# Patient Record
Sex: FEMALE | Race: BLACK OR AFRICAN AMERICAN | Employment: OTHER | URBAN - METROPOLITAN AREA
[De-identification: names, ages, dates, MRNs, and addresses within clinical notes are randomized per-mention and may not be internally consistent; named-entity substitution may affect disease eponyms.]

---

## 2020-01-13 ENCOUNTER — OFFICE VISIT (OUTPATIENT)
Dept: ORTHOPEDIC SURGERY | Age: 65
End: 2020-01-13
Payer: COMMERCIAL

## 2020-01-13 VITALS — HEIGHT: 65 IN | WEIGHT: 173.06 LBS | BODY MASS INDEX: 28.83 KG/M2

## 2020-01-13 PROCEDURE — 20610 DRAIN/INJ JOINT/BURSA W/O US: CPT | Performed by: ORTHOPAEDIC SURGERY

## 2020-01-13 PROCEDURE — 99204 OFFICE O/P NEW MOD 45 MIN: CPT | Performed by: ORTHOPAEDIC SURGERY

## 2020-01-13 RX ORDER — BETAMETHASONE SODIUM PHOSPHATE AND BETAMETHASONE ACETATE 3; 3 MG/ML; MG/ML
6 INJECTION, SUSPENSION INTRA-ARTICULAR; INTRALESIONAL; INTRAMUSCULAR; SOFT TISSUE ONCE
Status: COMPLETED | OUTPATIENT
Start: 2020-01-13 | End: 2020-01-13

## 2020-01-13 RX ADMIN — BETAMETHASONE SODIUM PHOSPHATE AND BETAMETHASONE ACETATE 6 MG: 3; 3 INJECTION, SUSPENSION INTRA-ARTICULAR; INTRALESIONAL; INTRAMUSCULAR; SOFT TISSUE at 16:03

## 2020-01-13 NOTE — PROGRESS NOTES
1/13/20  History of Present Illness:                             Felicia Soulier is a 76 y.o. female being seen for the first time she injured her knee while squatting approximately 3 to 4 months ago      Chief complaint presented in the office today: Pain swelling loss of motion significant swelling at the end of the day    Location left knee  Severity severe  Duration more than 3 months  Associated sign/symptoms pain, swelling, loss of motion    I have reviewed and discussed the below Pain assessment findings with the patient. Pain Assessment  Location of Pain: Knee  Location Modifiers: Left, Lateral  Severity of Pain: 8  Quality of Pain: Sharp, Aching  Duration of Pain: Persistent  Frequency of Pain: Constant  Aggravating Factors: Exercise, Stairs, Walking  Limiting Behavior: Some  Relieving Factors: Rest  Result of Injury: Yes  Work-Related Injury: No  Are there other pain locations you wish to document?: No    Medical History  Patient's medications, allergies, past medical, surgical, social and family histories were reviewed and updated as appropriate.     Past Medical History:   Diagnosis Date    Ankle fracture, lateral malleolus, closed 6/29/2010    Dr. Jose Manuel Hurd pain     Chronic, Dr. Verona Mancia BPPV (benign paroxysmal positional vertigo)     Dizziness and giddiness     Dysphagia     Fibroids 1987    Insomnia, unspecified     Meniere's disease, unspecified     Osteopenia     Other chest pain      Family History   Problem Relation Age of Onset    Diabetes Father         DM 2    Cancer Sister         Breast    Hypertension Other     High Blood Pressure Mother     Diabetes Paternal Aunt     Diabetes Paternal Uncle      Social History     Socioeconomic History    Marital status: Single     Spouse name: None    Number of children: None    Years of education: None    Highest education level: None   Occupational History    None   Social Needs    Financial resource strain: None    Food insecurity:     Worry: None     Inability: None    Transportation needs:     Medical: None     Non-medical: None   Tobacco Use    Smoking status: Never Smoker    Smokeless tobacco: Never Used   Substance and Sexual Activity    Alcohol use: No     Comment: Less than 1 glass per month of wine    Drug use: No    Sexual activity: Never   Lifestyle    Physical activity:     Days per week: None     Minutes per session: None    Stress: None   Relationships    Social connections:     Talks on phone: None     Gets together: None     Attends Oriental orthodox service: None     Active member of club or organization: None     Attends meetings of clubs or organizations: None     Relationship status: None    Intimate partner violence:     Fear of current or ex partner: None     Emotionally abused: None     Physically abused: None     Forced sexual activity: None   Other Topics Concern    None   Social History Narrative    None     Current Outpatient Medications   Medication Sig Dispense Refill    meclizine (ANTIVERT) 25 MG tablet Take 1 tablet by mouth 3 times daily as needed for Dizziness or Nausea. 30 tablet 2    hydrocodone-acetaminophen (CO-GESIC) 5-500 MG per tablet Take 1-2 tablets by mouth every 6 hours as needed for Pain. 30 tablet 0    Cholecalciferol (VITAMIN D) 1000 UNIT CAPS Take 1 capsule by mouth daily. No current facility-administered medications for this visit. Allergies   Allergen Reactions    Codeine Nausea Only       REVIEW OF SYSTEMS:   No acute rash  No numbness  No tingling  No fevers  No depression    Pertinent items are noted in HPI  Review of systems reviewed from Patient History Form dated on 1/13/2020 and available in the patient's chart under the Media tab. Examination:  General Exam:    Vitals: Height 5' 5\" (1.651 m), weight 173 lb 1 oz (78.5 kg).   Constitutional: Patient is adequately groomed with no evidence of floor with out pain    Gait: antalgic     Reflex:    Lower extremity Deep tendon reflexes +2/4 and equal bilaterally for patella and Achilles  Upper extremity reflexes:  of the biceps, triceps, brachioradialis +2/4 equal bilaterally    Contralateral  Knee: Negative Lachman negative anterior drawer negative pivot shift no posterior sag no posterior drawer does not open to valgus or varus stress at 0 or 30° negative Steinmann's, negative Diana's, negative Homans, negative Froylan, pedal pulses are +2/4 capillary refill is brisk sensation is intact ankle exam and hip exam are shows no pain with full range of motion provocative testing of the hip is nonpainful muscle testing around the hip is 5 over 5. Lumbar exam:    L1: good strength of the iliopsoas muscle upper lateral thigh sensation is intact  L2: good strength of the iliopsoas muscle and medial epicondyle sensation is intact Lateral thigh sensation is intact  L3: good strength with out pain of obturator externus muscle sensation is intact to medial epicondyle of the femur   L4:Good quadratus strength and gluteus medius and minimus strength, sensation is intact to medial malleolus  L5:Intact Extensor hallucis and tibialis posterior strength, sensation is intact to dorsum of the foot. S1:  Plantar foot sensation is intact  S2:  Posterior thigh and calf sensation is intact      Hip and lumbar testing does not reproduce pain provocative testing does not reproduce the symptomatology. Additional Examinations:  Right Upper Extremity:  Examination of the right upper extremity does not show any tenderness, deformity or injury. Range of motion is unremarkable. There is no gross instability. There are no rashes, ulcerations or lesions. Strength and tone are normal.  Left Upper Extremity: Examination of the left upper extremity does not show any tenderness, deformity or injury. Range of motion is unremarkable. There is no gross instability.   There are no rashes, ulcerations or lesions. Strength and tone are normal.  Lower Back: Examination of the lower back does not show any tenderness, deformity or injury. Range of motion is unremarkable. There is no gross instability. There are no rashes, ulcerations or lesions. Strength and tone are normal.          IMPRESSION:    Diagnostic testing:  X-rays reviewed in office, I independently reviewed the films in the office today:  I reviewed multiple X-rays of the left knee today, anterior posterior, lateral, notch view, skyline view:  Show no fracture no dislocation moderate shift with moderate arthritic wear, decreased joint space maintenance, no masses or tumors, no signs of any significant osteopenia, no obvious OCD lesions, no loose bodies seen. Impression early arthritis with shifting  MRI: Ordered today to rule out simple loose body  Labs: None ordered      Past Surgical History:   Procedure Laterality Date    COLONOSCOPY  2007    UPPER GASTROINTESTINAL ENDOSCOPY  2009   . Office Procedures:  Orders Placed This Encounter   Procedures    XR KNEE LEFT (MIN 4 VIEWS)     Standing Status:   Future     Number of Occurrences:   1     Standing Expiration Date:   1/13/2021     I discussed in detail the risks, benefits and complications of an injection which included but are not limited to infection, skin reactions, hot swollen joint, and anaphylaxis with the patient. The patient verbalized understanding and gave informed consent for the injection. The patient's skin was prepped using sterile alcohol solution. A sterile 22-gauge needle was inserted into the left knee and a mixture of 3ml of 6mg/ml Celestone, 7mL of 0.5% Marcaine  was injected under sterile technique. The needle was withdrawn and the puncture site sealed with a Band-Aid. The patient tolerated the injection well. The patient was instructed to call the office immediately if there is any pain, redness, warmth, fever, or chills.   Prior to the injection I aspirated 70 cc of straw-colored exudate  Previous Treatments: Ice, rest, exercises, X-ray, anti-inflammatories,    Differential diagnosis: Medial meniscal tear, Lateral meniscal tear, Synovitis,  Loose body, stress fracture, patella femoral syndrome, osteoarthritis, chondral lesion, ACL tear, PCL injury, MCL or LCL injury, Capsular injury, infection, contusion, hip pathology, lumbar radiculopathy, Muscle injury, bone tumor, fracture, femoral acetabular osteoarthritis,    Diagnosis: Osteoarthritis with probable loose body and severe joint effusion        Plan: (Medical Decision Making)    1. Medications -injection and aspiration today in the future  2. PT -in the future  3. Further imaging -MRI  4. Follow up -after MRI    Luz Trejo D.O. [address] [place] [address] and Sports Medicine  Sports [facility] Tra[facility]  Board Certified  [employer] Team Physician          Disclaimer: \"This note was dictated with voice recognition software. Though review and correction are routine, we apologize for any errors. \"

## 2020-01-27 ENCOUNTER — OFFICE VISIT (OUTPATIENT)
Dept: ORTHOPEDIC SURGERY | Age: 65
End: 2020-01-27
Payer: MEDICARE

## 2020-01-27 VITALS — WEIGHT: 173.06 LBS | HEIGHT: 65 IN | BODY MASS INDEX: 28.83 KG/M2

## 2020-01-27 PROBLEM — M25.562 ACUTE PAIN OF LEFT KNEE: Status: ACTIVE | Noted: 2020-01-27

## 2020-01-27 PROCEDURE — 99214 OFFICE O/P EST MOD 30 MIN: CPT | Performed by: ORTHOPAEDIC SURGERY

## 2020-01-27 NOTE — PROGRESS NOTES
1/27/20  History of Present Illness:  Yassine Narayan is a 76 y.o. female    Chief complaint today in the office: Recheck evaluation of left knee    Location left knee   Severity severe  Duration several months  Associated sign/symptoms pain, swelling, catching, locking,    Medical History  Patient's medications, allergies, past medical, surgical, social and family histories were reviewed and updated as appropriate. Review of Systems  No new rashes  No numbness  No tingling  No fever  No depression  No new pain pattern  Pertinent items are noted in HPI  Review of systems reviewed from Patient History Form dated on 1/27/2020 and available in the patient's chart under the Media tab. No change in the patients medical history form. Examination:  General Exam:    Vitals: Height 5' 5\" (1.651 m), weight 173 lb 1 oz (78.5 kg). Constitutional: Patient is adequately groomed with no evidence of malnutrition  Mental Status: The patient is alert and  oriented to time, place and person. The patient's mood and affect are appropriate. Lymphatic: The lymphatic examination bilaterally reveals all areas to be without enlargement or induration. Vascular: Examination reveals no swelling or calf tenderness. Peripheral pulses are palpable and 2+. Neurological: The patient has good coordination. There is no weakness or sensory deficit. Skin:    Head/Neck: inspection reveals no rashes, ulcerations or lesions. Trunk:  inspection reveals no rashes, ulcerations or lesions. Right Lower Extremity: inspection reveals no rashes, ulcerations or lesions. Left Lower Extremity: inspection reveals no rashes, ulcerations or lesions.                                           PHYSICAL EXAM:        Knee Examination  Inspection:  No abrasions no lacerations no signs of infection or obvious deformity moderate obvious  swelling and joint effusion     Palpation:   Palpation reveals moderate pain medial joint line,   Moderate lateral

## 2020-01-28 ENCOUNTER — TELEPHONE (OUTPATIENT)
Dept: ORTHOPEDIC SURGERY | Age: 65
End: 2020-01-28

## 2020-01-30 RX ORDER — MELOXICAM 15 MG/1
15 TABLET ORAL DAILY
Qty: 90 TABLET | Refills: 3 | Status: SHIPPED | OUTPATIENT
Start: 2020-02-04

## 2020-01-30 NOTE — PROGRESS NOTES
Name_______________________________________Printed:____________________  Date and time of hkwnxit__8-1-32______________________Kvbbfkb Time:_0900 MASC_______________   1. The instructions given regarding when and if a patient needs to stop oral intake prior to surgery varies. Follow the specific instructions you were given                  _X__Nothing to eat or to drink after Midnight the night before.                             ____Endoscopy patient follow your DRS instructions-generally you will be doing a part of the prep after Midnight                   ____Carbo loading or ERAS instructions will be given to select patients-if you have been given those instructions -please do the following                           The evening before your surgery after dinner before midnight drink 40 ounces of gatorade. If you are diabetic use sugar free. The morning of surgery drink 40 ounces of water. This needs to be finished 3 hours prior to your surgery start time. 2. Take the following pills with a small sip of water on the morning of surgery__NONE_________________________________________________                  Do not take blood pressure medications ending in pril or sartan the ricky prior to surgery or the morning of surgery_   3. Aspirin, Ibuprofen, Advil, Naproxen, Vitamin E and other Anti-inflammatory products and supplements should be stopped for 5 -7days before surgery or as directed by your physician. 4. Check with your Doctor regarding stopping Plavix, Coumadin,Eliquis, Lovenox,Effient,Pradaxa,Xarelto, Fragmin or other blood thinners and follow their instructions. 5. Do not smoke, and do not drink any alcoholic beverages 24 hours prior to surgery. This includes NA Beer. Refrain from the usage of any recreational drugs. 6. You may brush your teeth and gargle the morning of surgery. DO NOT SWALLOW WATER   7.  You MUST make arrangements for a responsible adult to stay on site while you are here and take you home after your surgery. You will not be allowed to leave alone or drive yourself home. It is strongly suggested someone stay with you the first 24 hrs. Your surgery will be cancelled if you do not have a ride home. 8. A parent/legal guardian must accompany a child scheduled for surgery and plan to stay at the hospital until the child is discharged. Please do not bring other children with you. 9. Please wear simple, loose fitting clothing to the hospital.  Lisa Thomson not bring valuables (money, credit cards, checkbooks, etc.) Do not wear any makeup (including no eye makeup) or nail polish on your fingers or toes. 10. DO NOT wear any jewelry or piercings on day of surgery. All body piercing jewelry must be removed. 11. If you have ___dentures, they will be removed before going to the OR; we will provide you a container. If you wear ___contact lenses or ___glasses, they will be removed; please bring a case for them. 12. Please see your family doctor/pediatrician for a history & physical and/or concerning medications. Bring any test results/reports from your physician's office. PCP__________________Phone___________H&P Appt. Date________             13 If you  have a Living Will and Durable Power of  for Healthcare, please bring in a copy. 15. Notify your Surgeon if you develop any illness between now and surgery  time, cough, cold, fever, sore throat, nausea, vomiting, etc.  Please notify your surgeon if you experience dizziness, shortness of breath or blurred vision between now & the time of your surgery             15. DO NOT shave your operative site 96 hours prior to surgery. For face & neck surgery, men may use an electric razor 48 hours prior to surgery. 16. Shower the night before or morning of surgery using an antibacterial soap or as you have been instructed.              17. To provide excellent care visitors will be limited to one in the room at any given time. 18.  Please bring picture ID and insurance card. 19.  Visit our web site for additional information:  [email or web address]              20.During flu season no children under the age of 15 are permitted in the hospital for the safety of all patients. 21. If you take a long acting insulin in the evening only  take half of your usual  dose the night  before your procedure              22. If you use a c-pap please bring DOS if staying overnight,             23.For your convenience [facility] has a pharmacy on site to fill your prescriptions. 24. If you use oxygen and have a portable tank please bring it  with you the DOS             25. Bring a complete list of all your medications with name and dose include any supplements. 26. Other__________________________________________   *Please call pre admission testing if you any further questions   [address] [place] [address]. [place]  [phone]   [address] [place] [address]       All above information reviewed with patient in person or by phone. Patient verbalizes understanding. All questions and concerns addressed.                                                                                                  Patient/Rep___PT_________________                                                                                                                                    PRE OP INSTRUCTIONS

## 2020-02-01 ENCOUNTER — PREP FOR PROCEDURE (OUTPATIENT)
Dept: ORTHOPEDIC SURGERY | Age: 65
End: 2020-02-01

## 2020-02-01 RX ORDER — ONDANSETRON 2 MG/ML
4 INJECTION INTRAMUSCULAR; INTRAVENOUS EVERY 6 HOURS PRN
Status: CANCELLED | OUTPATIENT
Start: 2020-02-01

## 2020-02-01 RX ORDER — DOCUSATE SODIUM 100 MG/1
100 CAPSULE, LIQUID FILLED ORAL 2 TIMES DAILY
Status: CANCELLED | OUTPATIENT
Start: 2020-02-01

## 2020-02-04 ENCOUNTER — HOSPITAL ENCOUNTER (OUTPATIENT)
Age: 65
Setting detail: OUTPATIENT SURGERY
Discharge: HOME | End: 2020-02-04
Attending: ORTHOPAEDIC SURGERY | Admitting: ORTHOPAEDIC SURGERY
Payer: MEDICARE

## 2020-02-04 ENCOUNTER — ANESTHESIA EVENT (OUTPATIENT)
Dept: OPERATING ROOM | Age: 65
End: 2020-02-04
Payer: MEDICARE

## 2020-02-04 ENCOUNTER — ANESTHESIA (OUTPATIENT)
Dept: OPERATING ROOM | Age: 65
End: 2020-02-04
Payer: MEDICARE

## 2020-02-04 VITALS
TEMPERATURE: 97.4 F | WEIGHT: 155 LBS | BODY MASS INDEX: 26.46 KG/M2 | HEIGHT: 64 IN | OXYGEN SATURATION: 100 % | HEART RATE: 61 BPM | DIASTOLIC BLOOD PRESSURE: 75 MMHG | SYSTOLIC BLOOD PRESSURE: 131 MMHG | RESPIRATION RATE: 16 BRPM

## 2020-02-04 VITALS — DIASTOLIC BLOOD PRESSURE: 53 MMHG | OXYGEN SATURATION: 99 % | SYSTOLIC BLOOD PRESSURE: 101 MMHG

## 2020-02-04 PROCEDURE — 2580000003 HC RX 258: Performed by: ORTHOPAEDIC SURGERY

## 2020-02-04 PROCEDURE — 6360000002 HC RX W HCPCS: Performed by: NURSE ANESTHETIST, CERTIFIED REGISTERED

## 2020-02-04 PROCEDURE — 2580000003 HC RX 258: Performed by: ANESTHESIOLOGY

## 2020-02-04 PROCEDURE — 6360000002 HC RX W HCPCS: Performed by: ORTHOPAEDIC SURGERY

## 2020-02-04 PROCEDURE — 3700000000 HC ANESTHESIA ATTENDED CARE: Performed by: ORTHOPAEDIC SURGERY

## 2020-02-04 PROCEDURE — 2500000003 HC RX 250 WO HCPCS: Performed by: NURSE ANESTHETIST, CERTIFIED REGISTERED

## 2020-02-04 PROCEDURE — 7100000000 HC PACU RECOVERY - FIRST 15 MIN: Performed by: ORTHOPAEDIC SURGERY

## 2020-02-04 PROCEDURE — 6370000000 HC RX 637 (ALT 250 FOR IP): Performed by: ANESTHESIOLOGY

## 2020-02-04 PROCEDURE — 3600000004 HC SURGERY LEVEL 4 BASE: Performed by: ORTHOPAEDIC SURGERY

## 2020-02-04 PROCEDURE — 2500000003 HC RX 250 WO HCPCS: Performed by: ORTHOPAEDIC SURGERY

## 2020-02-04 PROCEDURE — 7100000010 HC PHASE II RECOVERY - FIRST 15 MIN: Performed by: ORTHOPAEDIC SURGERY

## 2020-02-04 PROCEDURE — 3600000014 HC SURGERY LEVEL 4 ADDTL 15MIN: Performed by: ORTHOPAEDIC SURGERY

## 2020-02-04 PROCEDURE — 7100000001 HC PACU RECOVERY - ADDTL 15 MIN: Performed by: ORTHOPAEDIC SURGERY

## 2020-02-04 PROCEDURE — 2720000010 HC SURG SUPPLY STERILE: Performed by: ORTHOPAEDIC SURGERY

## 2020-02-04 PROCEDURE — 7100000011 HC PHASE II RECOVERY - ADDTL 15 MIN: Performed by: ORTHOPAEDIC SURGERY

## 2020-02-04 PROCEDURE — 2709999900 HC NON-CHARGEABLE SUPPLY: Performed by: ORTHOPAEDIC SURGERY

## 2020-02-04 PROCEDURE — 3700000001 HC ADD 15 MINUTES (ANESTHESIA): Performed by: ORTHOPAEDIC SURGERY

## 2020-02-04 RX ORDER — FENTANYL CITRATE 50 UG/ML
INJECTION, SOLUTION INTRAMUSCULAR; INTRAVENOUS PRN
Status: DISCONTINUED | OUTPATIENT
Start: 2020-02-04 | End: 2020-02-04 | Stop reason: SDUPTHER

## 2020-02-04 RX ORDER — HYDROMORPHONE HCL 110MG/55ML
0.5 PATIENT CONTROLLED ANALGESIA SYRINGE INTRAVENOUS EVERY 5 MIN PRN
Status: DISCONTINUED | OUTPATIENT
Start: 2020-02-04 | End: 2020-02-04 | Stop reason: HOSPADM

## 2020-02-04 RX ORDER — ONDANSETRON 2 MG/ML
4 INJECTION INTRAMUSCULAR; INTRAVENOUS EVERY 6 HOURS PRN
Status: DISCONTINUED | OUTPATIENT
Start: 2020-02-04 | End: 2020-02-04 | Stop reason: HOSPADM

## 2020-02-04 RX ORDER — TRAMADOL HYDROCHLORIDE 50 MG/1
50 TABLET ORAL ONCE
Status: COMPLETED | OUTPATIENT
Start: 2020-02-04 | End: 2020-02-04

## 2020-02-04 RX ORDER — PROPOFOL 10 MG/ML
INJECTION, EMULSION INTRAVENOUS PRN
Status: DISCONTINUED | OUTPATIENT
Start: 2020-02-04 | End: 2020-02-04 | Stop reason: SDUPTHER

## 2020-02-04 RX ORDER — TRAMADOL HYDROCHLORIDE 50 MG/1
TABLET ORAL
Status: DISCONTINUED
Start: 2020-02-04 | End: 2020-02-04 | Stop reason: HOSPADM

## 2020-02-04 RX ORDER — SODIUM CHLORIDE 9 MG/ML
INJECTION, SOLUTION INTRAVENOUS CONTINUOUS
Status: DISCONTINUED | OUTPATIENT
Start: 2020-02-04 | End: 2020-02-04

## 2020-02-04 RX ORDER — PROPOFOL 10 MG/ML
INJECTION, EMULSION INTRAVENOUS CONTINUOUS PRN
Status: DISCONTINUED | OUTPATIENT
Start: 2020-02-04 | End: 2020-02-04 | Stop reason: SDUPTHER

## 2020-02-04 RX ORDER — SODIUM CHLORIDE 0.9 % (FLUSH) 0.9 %
10 SYRINGE (ML) INJECTION EVERY 12 HOURS SCHEDULED
Status: DISCONTINUED | OUTPATIENT
Start: 2020-02-04 | End: 2020-02-04 | Stop reason: HOSPADM

## 2020-02-04 RX ORDER — SODIUM CHLORIDE 9 MG/ML
INJECTION, SOLUTION INTRAVENOUS CONTINUOUS
Status: DISCONTINUED | OUTPATIENT
Start: 2020-02-04 | End: 2020-02-04 | Stop reason: HOSPADM

## 2020-02-04 RX ORDER — LIDOCAINE HYDROCHLORIDE 20 MG/ML
INJECTION, SOLUTION EPIDURAL; INFILTRATION; INTRACAUDAL; PERINEURAL PRN
Status: DISCONTINUED | OUTPATIENT
Start: 2020-02-04 | End: 2020-02-04 | Stop reason: SDUPTHER

## 2020-02-04 RX ORDER — SODIUM CHLORIDE 0.9 % (FLUSH) 0.9 %
10 SYRINGE (ML) INJECTION PRN
Status: DISCONTINUED | OUTPATIENT
Start: 2020-02-04 | End: 2020-02-04 | Stop reason: HOSPADM

## 2020-02-04 RX ORDER — PROMETHAZINE HYDROCHLORIDE 25 MG/ML
6.25 INJECTION, SOLUTION INTRAMUSCULAR; INTRAVENOUS
Status: DISCONTINUED | OUTPATIENT
Start: 2020-02-04 | End: 2020-02-04 | Stop reason: HOSPADM

## 2020-02-04 RX ORDER — FENTANYL CITRATE 50 UG/ML
25 INJECTION, SOLUTION INTRAMUSCULAR; INTRAVENOUS EVERY 5 MIN PRN
Status: DISCONTINUED | OUTPATIENT
Start: 2020-02-04 | End: 2020-02-04 | Stop reason: HOSPADM

## 2020-02-04 RX ORDER — DOCUSATE SODIUM 100 MG/1
100 CAPSULE, LIQUID FILLED ORAL 2 TIMES DAILY
Status: DISCONTINUED | OUTPATIENT
Start: 2020-02-04 | End: 2020-02-04 | Stop reason: HOSPADM

## 2020-02-04 RX ORDER — LABETALOL HYDROCHLORIDE 5 MG/ML
5 INJECTION, SOLUTION INTRAVENOUS EVERY 10 MIN PRN
Status: DISCONTINUED | OUTPATIENT
Start: 2020-02-04 | End: 2020-02-04 | Stop reason: HOSPADM

## 2020-02-04 RX ADMIN — SODIUM CHLORIDE: 9 INJECTION, SOLUTION INTRAVENOUS at 09:28

## 2020-02-04 RX ADMIN — CEFAZOLIN SODIUM 2 G: 10 INJECTION, POWDER, FOR SOLUTION INTRAVENOUS at 10:21

## 2020-02-04 RX ADMIN — FENTANYL CITRATE 25 MCG: 50 INJECTION, SOLUTION INTRAMUSCULAR; INTRAVENOUS at 10:29

## 2020-02-04 RX ADMIN — PROPOFOL 10 MG: 10 INJECTION, EMULSION INTRAVENOUS at 10:30

## 2020-02-04 RX ADMIN — LIDOCAINE HYDROCHLORIDE 60 MG: 20 INJECTION, SOLUTION EPIDURAL; INFILTRATION; INTRACAUDAL; PERINEURAL at 10:29

## 2020-02-04 RX ADMIN — PROPOFOL 20 MG: 10 INJECTION, EMULSION INTRAVENOUS at 10:29

## 2020-02-04 RX ADMIN — FENTANYL CITRATE 25 MCG: 50 INJECTION, SOLUTION INTRAMUSCULAR; INTRAVENOUS at 10:37

## 2020-02-04 RX ADMIN — PROPOFOL 120 MCG/KG/MIN: 10 INJECTION, EMULSION INTRAVENOUS at 10:29

## 2020-02-04 RX ADMIN — FENTANYL CITRATE 25 MCG: 50 INJECTION, SOLUTION INTRAMUSCULAR; INTRAVENOUS at 10:59

## 2020-02-04 RX ADMIN — TRAMADOL HYDROCHLORIDE 50 MG: 50 TABLET, FILM COATED ORAL at 11:46

## 2020-02-04 RX ADMIN — FENTANYL CITRATE 25 MCG: 50 INJECTION, SOLUTION INTRAMUSCULAR; INTRAVENOUS at 11:10

## 2020-02-04 ASSESSMENT — PULMONARY FUNCTION TESTS
PIF_VALUE: 0
PIF_VALUE: 1
PIF_VALUE: 0
PIF_VALUE: 1
PIF_VALUE: 0
PIF_VALUE: 1
PIF_VALUE: 0
PIF_VALUE: 1
PIF_VALUE: 0
PIF_VALUE: 1
PIF_VALUE: 0
PIF_VALUE: 0

## 2020-02-04 ASSESSMENT — PAIN DESCRIPTION - DESCRIPTORS: DESCRIPTORS: DISCOMFORT;ACHING

## 2020-02-04 ASSESSMENT — PAIN DESCRIPTION - FREQUENCY: FREQUENCY: CONTINUOUS

## 2020-02-04 ASSESSMENT — PAIN DESCRIPTION - ONSET: ONSET: ON-GOING

## 2020-02-04 NOTE — ANESTHESIA PRE PROCEDURE
[facility] Department of Anesthesiology  Pre-Anesthesia Evaluation/Consultation       Name:  Candice Roland  : 1955  Age:  76 y.o. MRN:  [id number]  Date: 2020           Surgeon: Surgeon(s):  Gustabo Chao DO    Procedure: Procedure(s):  LEFT KNEE ARTHROSCOPE, PARTIAL MEDIAL AND LATERAL MENISCECTOMY, LOOSE BODIES REMOVAL  (44222, [id number])     Allergies   Allergen Reactions    Codeine Nausea Only, Nausea And Vomiting, Rash and Swelling    Adhesive Tape Rash     Patient Active Problem List   Diagnosis    Osteopenia    Acute pain of left knee     Past Medical History:   Diagnosis Date    Ankle fracture, lateral malleolus, closed 2010    Dr. Laquita Camejo    Ankle pain     Chronic, Dr. Lacey Pace BPPV (benign paroxysmal positional vertigo)     Dizziness and giddiness     Dysphagia     Fibroids     Hx of esophageal spasm     Insomnia, unspecified     Meniere's disease, unspecified     Osteopenia     Other chest pain      Past Surgical History:   Procedure Laterality Date    COLONOSCOPY  2007    UPPER GASTROINTESTINAL ENDOSCOPY       Social History     Tobacco Use    Smoking status: Never Smoker    Smokeless tobacco: Never Used   Substance Use Topics    Alcohol use: Not Currently    Drug use: No     Medications  No current facility-administered medications on file prior to encounter. Current Outpatient Medications on File Prior to Encounter   Medication Sig Dispense Refill    meclizine (ANTIVERT) 25 MG tablet Take 1 tablet by mouth 3 times daily as needed for Dizziness or Nausea. (Patient taking differently: Take 25 mg by mouth 3 times daily as needed for Dizziness or Nausea (NOT TAKING) ) 30 tablet 2    hydrocodone-acetaminophen (CO-GESIC) 5-500 MG per tablet Take 1-2 tablets by mouth every 6 hours as needed for Pain. 30 tablet 0    Cholecalciferol (VITAMIN D) 1000 UNIT CAPS Take 1 capsule by mouth daily. BMP    Lab Results   Component Value Date     05/23/2011    K 4.6 05/23/2011     05/23/2011    CO2 27 05/23/2011    BUN 13 05/23/2011    CREATININE 0.9 05/23/2011    CALCIUM 9.8 05/23/2011    GFRAA >60 05/23/2011    GLUCOSE 83 05/23/2011     POCGlucose  No results for input(s): GLUCOSE in the last 72 hours. Coags  No results found for: PROTIME, INR, APTT  HCG (If Applicable) No results found for: PREGTESTUR, PREGSERUM, HCG, HCGQUANT   ABGs No results found for: PHART, PO2ART, HYT5VCH, WOP5UUU, BEART, N4PPNMDZ   Type & Screen (If Applicable)  No results found for: LABABO, LABRH                         BMI: Wt Readings from Last 3 Encounters:       NPO Status:   Date of last liquid consumption: 02/04/20   Time of last liquid consumption: 0000   Date of last solid food consumption: 02/04/20      Time of last solid consumption: 0000       Anesthesia Evaluation  Patient summary reviewed no history of anesthetic complications:   Airway: Mallampati: III  TM distance: >3 FB   Neck ROM: full   Dental:    (+) caps      Pulmonary:Negative Pulmonary ROS and normal exam                               Cardiovascular:Negative CV ROS  Exercise tolerance: good (>4 METS),           Rhythm: regular  Rate: normal           Beta Blocker:  Not on Beta Blocker         Neuro/Psych:   Negative Neuro/Psych ROS              GI/Hepatic/Renal:   (+) GERD:,           Endo/Other: Negative Endo/Other ROS                    Abdominal:           Vascular: negative vascular ROS. Anesthesia Plan      MAC     ASA 2       Induction: intravenous. MIPS: Postoperative opioids intended and Prophylactic antiemetics administered. Anesthetic plan and risks discussed with patient and sibling. Plan discussed with CRNA.               This pre-anesthesia assessment may be used as a history and physical.    DOS STAFF ADDENDUM:    Pt seen and examined, chart reviewed (including anesthesia, drug and allergy history). No interval changes to history and physical examination. Anesthetic plan, risks, benefits, alternatives, and personnel involved discussed with patient. Questions and concerns addressed. Patient(family) verbalized an understanding and agrees to proceed.       Maria L Carver MD  February 4, 2020  9:41 AM

## 2020-02-04 NOTE — OP NOTE
chondroplasty. A separate arthroscopy of the lateral joint space compartment (which includes the the lateral femoral condyle the lateral tibial plateau the lateral meniscus popliteus and portions of the anterior cruciate ligament ) procedure performed was synovectomy, loose body removal.    A separate arthroscopy of the anterior joint space compartment (which included the anterior fat pad anterior to the medial and lateral compartment anterior to the medial and lateral gutter anterior to the intercondylar notch inferior to the patella trochlea articulation ) with procedure performed synovectomy, multiple loose body removal.    A separate arthroscopy of the posterior joint space compartment (behind the ACL, behind the PCL, posterior joint space behind the medial meniscus and posterior joint space behind the lateral meniscus including the likely area of a Baker's cyst ) with procedure performed synovectomy, multiple loose body removal.    A separate arthroscopy of the superior patella pouch compartment (which includes the superior patellar pouch medial to the medial gutter and superior to the medial gutter lateral to the lateral gutter and superior to the lateral gutter from the patella trochlea area all the way to the top of the knee joint )  with procedure performed synovectomy, loose body removal.    A separate arthroscopy of the medial gutter compartment (from the anterior medial joint space medial to the medial joint space anterior to the posterior joint space and below the superior patellar pouch and trochlea patella area.  )  With procedure performed synovectomy, loose body removal.    A separate arthroscopy of the lateral gutter compartment with procedure performed synovectomy, loose body removal.    A separate arthroscopy of the intercondylar notch compartment with procedure performed synovectomy.     A separate arthroscopy of the patella and trochlea articulation compartment and area with procedure performed synovectomy and loose bodies removed. Procedure in detail:  Patient was seen and evaluated A history and physical was obtained a written consent was discussed and signed by the patient. Following the anesthesia evaluation and discussion with the patient about the scheduled procedure and recovery along with postoperative follow-up plans the patient was brought back to the operative suite put on the operative table in the supine position. At this point the patient was given a MAC anesthetic. I personally scrubbed the knee with alcohol and Betadine and injected the joint with 60 mL total 30 mL of Marcaine 30 mL of lidocaine the lidocaine did have epinephrine using an 18-gauge 1-1/2 inch needle. The knee was put through a full range of motion and tested for stability with Lachman and anterior drawer pivot shift I also performed a posterior drawer assessed stability to valgus and varus stress at 0 and 30°. Following the evaluation and injection the knee was scrubbed with DuraPrep from upper thigh where we had put a U drape on the way through the foot. Then using sterile technique we draped the leg in a sterile manner. The inferior lateral portal was made using a sharp scalpel and a blunt trocar. The camera was slipped into the cannula suction and irrigation was hoped and started on the cannula. Once the fluid was running we could see the joint very nicely it was irrigated copiously to remove all loose debris and get a nice clear 4 K picture. A diagnostic arthroscopy was performed to evaluate the superior patellar pouch, the medial gutter, the lateral gutter, anterior medial joint space the anterior lateral joint space both meniscuses were probed medially and laterally using the figure-of-four for the lateral joint space using the lateral bar for the medial joint space the ACL and PCL were probed and the chondral surface was evaluated and probed.                  Seeing that the medial meniscus was torn and it was probed and evaluated and deemed unrepairable. Following this I went ahead and used a straight biter and a angled up biter to saucerized the tear then I used a shaver to remove the pieces and cleaning the edges and a bipolar to smooth this off. I went ahead and probed this it was a stable construct there were no more tears and it was a smooth transition. Seeing that the lateral meniscus was torn and it was evaluated and deemed nonrepairable. Upon probing and evaluating this I went ahead and used a straight duckbill biter and angled up biter to saucerize the lateral meniscus tear. I then used a shaver to clean off the loose pieces and trimmed off the edges followed by the bipolar to smooth this off. I went ahead and progress it was a nice stable construct. The camera was initially put in the lateral portal and a shaver was brought into the medial portal using the shaver I remove the synovial tissue from the superior patellar pouch and then used the bipolar to cauterize any bleeding tissue. From the same portal I remove the synovium from the medial gutter patella trochlear area the anterior medial joint space part of the anterior lateral joint space in the intertrochlear area using both the shaver and the bipolar. I now changed the camera to the medial portal using the shaver in the lateral portal I remove the remainder of the synovium from the anterior joint space the anterior lateral joint space in the lateral gutter all the way back up to the superior patellar pouch and across into the patella trochlear area once this was all excised we used the bipolar to smooth off any tissue and cauterize any bleeding tissue. The chondral surface had loose hanging debris. A chondroplasty was performed. The chondral surface was was trimmed with a shaver down to a stable surface and then I used a bipolar to smooth off the edges.       Upon

## 2020-02-04 NOTE — PROGRESS NOTES
Pt awake and alert. Pt on RA, VSS. Brother at bedside. Pt with c/o pain (see MAR), denies nausea, tolerating PO. Skin warm LLE, palpable pulses and able to wiggle toes  Pt meets criteria to be discharged from phase 1.

## 2020-02-04 NOTE — ANESTHESIA POSTPROCEDURE EVALUATION
[facility] Department of Anesthesiology  Post-Anesthesia Note       Name:  Cristopher Brand                                  Age:  76 y.o. MRN:  [id number]     Last Vitals & Oxygen Saturation: BP (!) 131/55   Pulse 68   Temp 97.1 °F (36.2 °C) (Temporal)   Resp 16   Ht 5' 4\" (1.626 m)   Wt 155 lb (70.3 kg)   SpO2 99%   BMI 26.61 kg/m²   Patient Vitals for the past 4 hrs:   BP Temp Temp src Pulse Resp SpO2 Height Weight   02/04/20 1130 (!) 131/55 97.1 °F (36.2 °C) Temporal 68 16 99 % -- --   02/04/20 1125 (!) 113/53 -- -- 68 -- 100 % -- --   02/04/20 1120 128/70 -- -- -- -- -- -- --   02/04/20 1118 126/69 97.2 °F (36.2 °C) Temporal 105 13 99 % -- --   02/04/20 0912 (!) 140/74 97.3 °F (36.3 °C) Temporal 71 16 97 % -- --   02/04/20 0906 -- -- -- -- -- -- 5' 4\" (1.626 m) 155 lb (70.3 kg)       Level of consciousness:  Awake, alert    Respiratory: Respirations easy, no distress. Stable. Cardiovascular: Hemodynamically stable. Hydration: Adequate. PONV: Adequately managed. Post-op pain: Adequately controlled. Post-op assessment: Tolerated anesthetic well without complication. Complications:  None.     Alma Flores MD  February 4, 2020   11:59 AM

## 2020-02-04 NOTE — PROGRESS NOTES
Pt arrived from OR to PACU bay 3. Report received from OR staff. Pt arouses easily to voice. Surgical dressing in place to left knee. Pt on RA, NSR, VSS. Will continue to monitor.

## 2020-02-04 NOTE — PROGRESS NOTES
CLINICAL PHARMACY NOTE: MEDS TO 3230 cFares Select Patient?: No  Total # of Prescriptions Filled: 2   The following medications were delivered to the patient:  · meloxicam 15mg  · Tramadol/APAP 37.5/325mg  Total # of Interventions Completed: 0  Time Spent (min): 30    Additional Documentation:  Insurance only paid for qty 21 tablets of ultracet, patient ok to pay for full qty.   Medications delivered- brother signed  Dc Rondon

## 2020-02-13 ENCOUNTER — OFFICE VISIT (OUTPATIENT)
Dept: ORTHOPEDIC SURGERY | Age: 65
End: 2020-02-13

## 2020-02-13 VITALS — BODY MASS INDEX: 26.46 KG/M2 | HEIGHT: 64 IN | WEIGHT: 154.98 LBS

## 2020-02-13 PROBLEM — S83.242D TEAR OF MEDIAL MENISCUS OF LEFT KNEE, SUBSEQUENT ENCOUNTER: Status: ACTIVE | Noted: 2020-02-13

## 2020-02-13 PROBLEM — M12.20 PVNS (PIGMENTED VILLONODULAR SYNOVITIS): Status: ACTIVE | Noted: 2020-02-13

## 2020-02-13 PROCEDURE — 99024 POSTOP FOLLOW-UP VISIT: CPT | Performed by: ORTHOPAEDIC SURGERY

## 2020-02-19 ENCOUNTER — HOSPITAL ENCOUNTER (OUTPATIENT)
Dept: PHYSICAL THERAPY | Age: 65
Setting detail: THERAPIES SERIES
Discharge: HOME | End: 2020-02-19
Payer: MEDICARE

## 2020-02-19 PROCEDURE — 97161 PT EVAL LOW COMPLEX 20 MIN: CPT

## 2020-02-19 PROCEDURE — 97110 THERAPEUTIC EXERCISES: CPT

## 2020-02-19 NOTE — FLOWSHEET NOTE
increasing ROM, reducing/eliminating soft tissue swelling/inflammation/restriction, improving soft tissue extensibility and allowing for proper ROM for normal function with self care, mobility, lifting and ambulation. Modalities:  CP declined and reported she will ice @ home 2/19    Charges:  Timed Code Treatment Minutes: 24'+eval   Total Treatment Minutes: 2:10-3:07  57'       [x] EVAL (LOW) 99659 (typically 20 minutes face-to-face)  [] EVAL (MOD) 87776 (typically 30 minutes face-to-face)  [] EVAL (HIGH) 60295 (typically 45 minutes face-to-face)  [] RE-EVAL   [x] NT(12192) x  2   [] IONTO  [] NMR (93472) x     [] VASO  [] Manual (18800) x      [] Other:  [] TA x      [] Mech Traction (11584)  [] ES(attended) (96763)      [] ES (un) (49152):     GOALS:   Patient stated goal: Return to climbing stairs without pain and physical activities     []? Progressing: []? Met: []? Not Met: []? Adjusted     Therapist goals for Patient:   Short Term Goals: To be achieved in: 4 weeks  1. Independent in HEP and progression per patient tolerance, in order to prevent re-injury. []? Progressing: []? Met: []? Not Met: []? Adjusted   2. Patient will have a decrease in pain to facilitate improvement in movement, function, and ADLs as indicated by Functional Deficits. []? Progressing: []? Met: []? Not Met: []? Adjusted     Long Term Goals: To be achieved in: 6 weeks  1. Disability index score of 5% or less for the LEFS to assist with reaching prior level of function. []? Progressing: []? Met: []? Not Met: []? Adjusted  2. Patient will demonstrate increased L knee AROM that is equal to R to allow for proper joint functioning as indicated by patients Functional Deficits. []? Progressing: []? Met: []? Not Met: []? Adjusted  3. Patient will demonstrate an increase in L LE strength to 4/5 in all directions and pain free to allow for proper functional mobility as indicated by patients Functional Deficits. []?  Progressing: []?

## 2020-02-19 NOTE — PLAN OF CARE
intervention required. [] Falls Risk assessed and Patient requires intervention due to being higher risk   TUG score (>12s at risk):     [] Falls education provided, including     ASSESSMENT:   Functional Impairments:     []Noted lumbar/proximal hip/LE hypomobility   [x]Decreased LE functional ROM   [x]Decreased core/proximal hip strength and neuromuscular control   [x]Decreased LE functional strength   [x]Reduced balance/proprioceptive control   []other:      Functional Activity Limitations (from functional questionnaire and intake)   [x]Reduced ability to tolerate prolonged functional positions   [x]Reduced ability or difficulty with changes of positions or transfers between positions   [x]Reduced ability to maintain good posture and demonstrate good body mechanics with sitting, bending, and lifting   [x]Reduced ability to sleep   [x] Reduced ability or tolerance with driving and/or computer work   [x]Reduced ability to perform lifting, carrying tasks   [x]Reduced ability to squat   [x]Reduced ability to forward bend   [x]Reduced ability to ambulate prolonged functional periods/distances/surfaces   [x]Reduced ability to ascend/descend stairs   [x]Reduced ability to run, hop or jump   []other:     Participation Restrictions   [x]Reduced participation in self care activities   [x]Reduced participation in home management activities   [x]Reduced participation in work activities   [x]Reduced participation in social activities. [x]Reduced participation in sport activities. Classification :    [x]Signs/symptoms consistent with post-surgical status including decreased ROM, strength and function.    []Signs/symptoms consistent with joint sprain/strain   []Signs/symptoms consistent with patella-femoral syndrome   []Signs/symptoms consistent with knee OA/hip OA   []Signs/symptoms consistent with internal derangement of knee/Hip   []Signs/symptoms consistent with functional hip weakness/NMR control      []Signs/symptoms consistent with tendinitis/tendinosis    []signs/symptoms consistent with pathology which may benefit from Dry needling      []other:      Prognosis/Rehab Potential:      []Excellent   [x]Good    []Fair   []Poor    Tolerance of evaluation/treatment:    []Excellent   [x]Good    []Fair   []Poor    Physical Therapy Evaluation Complexity Justification  [x] A history of present problem with:  [x] no personal factors and/or comorbidities that impact the plan of care;  []1-2 personal factors and/or comorbidities that impact the plan of care  []3 personal factors and/or comorbidities that impact the plan of care  [x] An examination of body systems using standardized tests and measures addressing any of the following: body structures and functions (impairments), activity limitations, and/or participation restrictions;:  [x] a total of 1-2 or more elements   [] a total of 3 or more elements   [] a total of 4 or more elements   [x] A clinical presentation with:  [x] stable and/or uncomplicated characteristics   [] evolving clinical presentation with changing characteristics  [] unstable and unpredictable characteristics;   [x] Clinical decision making of [x] low, [] moderate, [] high complexity using standardized patient assessment instrument and/or measurable assessment of functional outcome. [x] EVAL (LOW) 43690 (typically 20 minutes face-to-face)  [] EVAL (MOD) 66770 (typically 30 minutes face-to-face)  [] EVAL (HIGH) 66211 (typically 45 minutes face-to-face)  [] RE-EVAL       PLAN  Frequency/Duration:  2 days per week for 6 Weeks:  Interventions:  [x]  Therapeutic exercise including: strength training, ROM, for Lower extremity and core   [x]  NMR activation and proprioception for LE, Glutes and Core   [x]  Manual therapy as indicated for LE, Hip and spine to include: Dry Needling/IASTM, STM, PROM, Gr I-IV mobilizations, manipulation.    [x] Modalities as needed that may include: thermal agents, E-stim, Biofeedback, US,

## 2020-02-26 ENCOUNTER — HOSPITAL ENCOUNTER (OUTPATIENT)
Dept: PHYSICAL THERAPY | Age: 65
Setting detail: THERAPIES SERIES
Discharge: HOME | End: 2020-02-26
Payer: MEDICARE

## 2020-02-26 PROCEDURE — 97016 VASOPNEUMATIC DEVICE THERAPY: CPT

## 2020-02-26 PROCEDURE — 97110 THERAPEUTIC EXERCISES: CPT

## 2020-02-26 PROCEDURE — 97112 NEUROMUSCULAR REEDUCATION: CPT

## 2020-02-26 NOTE — FLOWSHEET NOTE
[place] [id number] [address]  Phone: [phone]   Fax:     [phone]    Physical Therapy Daily Treatment Note  Date:  2020    Patient Name:  Amanda Macdonald    :  1955  MRN: [id number]  Restrictions/Precautions:    Medical/Treatment Diagnosis Information:  · Diagnosis: S83.242D (ICD-10-CM) - Tear of medial meniscus of left knee, subsequent encounter  · Treatment Diagnosis: M25.562 Left knee pain   Insurance/Certification information:  PT Insurance Information: [employer]  Physician Information:  Referring Practitioner: Ailyn Plasencia DO  Has the plan of care been signed (Y/N):        [x]  Yes  []  No     Date of Patient follow up with Physician: 3/19/20      Is this a Progress Report:     []  Yes  [x]  No        If Yes:  Date Range for reporting period:  Beginning  Ending    Progress report will be due (10 Rx or 30 days whichever is less):        Recertification will be due (POC Duration  / 90 days whichever is less): 20         Visit # Insurance Allowable Auth Required   2   [employer]  No limit []  Yes [x]  No        Functional Scale: WOMAC 52% deficit   Date assessed:  20       Latex Allergy:  [x]NO      []YES  Preferred Language for Healthcare:   [x]English       []other:    Pain level:  4/10 2/26     SUBJECTIVE:   Pt states she has not been using crutches for short distances and still has a limp. States she is challenged with  Prone quad stretch and SLR abduction but no increase in symptoms reported.      OBJECTIVE: See eval 20    Observation:    Test measurements:  L knee AROM 0-119 deg     RESTRICTIONS/PRECAUTIONS: S/P L knee menisectomy 19    Exercises/Interventions:   Exercises/Interventions:   Exercise/Equipment Resistance/Repetitions Other comments   Stretching/AROM       Bike Rocking ROM x5'    Calf stretch 30\"hx3 L  slantboard    Hamstring 30\"hx3 L 2/19 seated EOT   Quad 30\"hx3 L 2/19 prone   Heel slide L 2/26 progressed with SB roll ins   SB roll ins 10\"hx10  2/26           Strengthening      SLR  Flexion 1# 3x10 L  Abduction 0# 3x10 L  Adduction 0# 3x10 L  Extension 0# 3x10 L ^2/26, withheld weight withother exercises d/t fatigue     Quad set  2/26 see progression to SAQ   [place] TB 3x10 R 2/26 side lying               Neuromuscular re-ed      SAQ 10\"hx10 L 2/26   Bridge 3x10 2/26 cues for proper glut activation    SLB                  Quantum machines       Leg press        Leg extension       Leg curl               Manual interventions                               Therapeutic Exercise and NMR EXR  [x] (18618) Provided verbal/tactile cueing for activities related to strengthening, flexibility, endurance, ROM for improvements in LE, proximal hip, and core control with self care, mobility, lifting, ambulation.  [] (43619) Provided verbal/tactile cueing for activities related to improving balance, coordination, kinesthetic sense, posture, motor skill, proprioception  to assist with LE, proximal hip, and core control in self care, mobility, lifting, ambulation and eccentric single leg control.      NMR and Therapeutic Activities:    [] (52175 or 92452) Provided verbal/tactile cueing for activities related to improving balance, coordination, kinesthetic sense, posture, motor skill, proprioception and motor activation to allow for proper function of core, proximal hip and LE with self care and ADLs  [] (17707) Gait Re-education- Provided training and instruction to the patient for proper LE, core and proximal hip recruitment and positioning and eccentric body weight control with ambulation re-education including up and down stairs     Home Exercise Program:    [x] (50741) Reviewed/Progressed HEP activities related to strengthening, flexibility, endurance, ROM of core, proximal hip and LE for functional self-care, mobility, lifting and ambulation/stair navigation   [] (91416)Reviewed/Progressed HEP activities related to improving balance, coordination, kinesthetic sense, posture, motor skill, proprioception of core, proximal hip and LE for self care, mobility, lifting, and ambulation/stair navigation      Manual Treatments:  PROM / STM / Oscillations-Mobs:  G-I, II, III, IV (PA's, Inf., Post.)  [] (40341) Provided manual therapy to mobilize LE, proximal hip and/or LS spine soft tissue/joints for the purpose of modulating pain, promoting relaxation,  increasing ROM, reducing/eliminating soft tissue swelling/inflammation/restriction, improving soft tissue extensibility and allowing for proper ROM for normal function with self care, mobility, lifting and ambulation. Modalities:  Vaso L knee x15' 2/26    Charges:  Timed Code Treatment Minutes: 36'   Total Treatment Minutes: 12:00-1:00  60'       [] EVAL (LOW) 86502 (typically 20 minutes face-to-face)  [] EVAL (MOD) 40413 (typically 30 minutes face-to-face)  [] EVAL (HIGH) 16444 (typically 45 minutes face-to-face)  [] RE-EVAL   [x] QK(62094) x  2   [] IONTO  [x] NMR (42828) x 1    [x] VASO  [] Manual (66816) x      [] Other:  [] TA x      [] Mech Traction (95232)  [] ES(attended) (45533)      [] ES (un) (03803):     GOALS:   Patient stated goal: Return to climbing stairs without pain and physical activities     []? Progressing: []? Met: []? Not Met: []? Adjusted     Therapist goals for Patient:   Short Term Goals: To be achieved in: 4 weeks  1. Independent in HEP and progression per patient tolerance, in order to prevent re-injury. []? Progressing: []? Met: []? Not Met: []? Adjusted   2. Patient will have a decrease in pain to facilitate improvement in movement, function, and ADLs as indicated by Functional Deficits. []? Progressing: []? Met: []? Not Met: []? Adjusted     Long Term Goals: To be achieved in: 6 weeks  1.  Disability index score of 5% or less for the LEFS to assist with reaching prior level of and importance of HEP. [address] access code [email or web address]   2/26 Updated HEP based on progress. Reviewed importance of using 1 crutch based on continued limp to decrease risk of increase in symptoms or falls. PLAN:   2/26 Monitor symptoms, progress ROM and exercises in weight bearing as tolearted. [x] Continue per plan of care [] Alter current plan (see comments above)  [x] Plan of care initiated [] Hold pending MD visit [] Discharge      Electronically signed by:  Justus Gaines, PT    Note: If patient does not return for scheduled/ recommended follow up visits, this note will serve as a discharge from care along with most recent update on progress.

## 2020-02-28 ENCOUNTER — HOSPITAL ENCOUNTER (OUTPATIENT)
Dept: PHYSICAL THERAPY | Age: 65
Setting detail: THERAPIES SERIES
Discharge: HOME | End: 2020-02-28
Payer: MEDICARE

## 2020-02-28 PROCEDURE — 97110 THERAPEUTIC EXERCISES: CPT

## 2020-02-28 PROCEDURE — 97016 VASOPNEUMATIC DEVICE THERAPY: CPT

## 2020-02-28 PROCEDURE — 97112 NEUROMUSCULAR REEDUCATION: CPT

## 2020-03-04 ENCOUNTER — HOSPITAL ENCOUNTER (OUTPATIENT)
Dept: PHYSICAL THERAPY | Age: 65
Setting detail: THERAPIES SERIES
Discharge: HOME | End: 2020-03-04
Payer: MEDICARE

## 2020-03-04 PROCEDURE — 97140 MANUAL THERAPY 1/> REGIONS: CPT

## 2020-03-04 PROCEDURE — 97016 VASOPNEUMATIC DEVICE THERAPY: CPT

## 2020-03-04 PROCEDURE — 97112 NEUROMUSCULAR REEDUCATION: CPT

## 2020-03-04 PROCEDURE — 97110 THERAPEUTIC EXERCISES: CPT

## 2020-03-04 NOTE — FLOWSHEET NOTE
and requires additional follow up with physician  [] Other    Prognosis for POC: [x] Good [] Fair  [] Poor      Patient requires continued skilled intervention: [x] Yes  [] No    Treatment/Activity Tolerance:  [x] Patient able to complete treatment  [] Patient limited by fatigue  [] Patient limited by pain     [] Patient limited by other medical complications  [] Other:   3/4 Continues to have mild swelling @ L knee and return in tightness @ L distal ITB and anterior aspect of knee d/t increase activity level on L. Tolerated treatment well with no increase in symptoms and no decrease in knee ROM noted. Patient Education:                2/19 Educated on precautions, use of 1 crutch, icing and importance of HEP. Tavia Pulling access code [email or web address]   2/26 Updated HEP based on progress. Reviewed importance of using 1 crutch based on continued limp to decrease risk of increase in symptoms or falls. 2/28 updated HEP   3/4 Educated on precautions and not overdoing it with daily activities and HEP. PLAN:   3/4 Monitor symptoms and progress with strengthening and balance in weight bearing as tolerated. Monitor knee ROM. [x] Continue per plan of care [] Alter current plan (see comments above)  [x] Plan of care initiated [] Hold pending MD visit [] Discharge      Electronically signed by:  Zari Portillo PT    Note: If patient does not return for scheduled/ recommended follow up visits, this note will serve as a discharge from care along with most recent update on progress.

## 2020-03-06 ENCOUNTER — HOSPITAL ENCOUNTER (OUTPATIENT)
Dept: PHYSICAL THERAPY | Age: 65
Setting detail: THERAPIES SERIES
Discharge: HOME | End: 2020-03-06
Payer: MEDICARE

## 2020-03-06 PROCEDURE — 97140 MANUAL THERAPY 1/> REGIONS: CPT

## 2020-03-06 PROCEDURE — 97112 NEUROMUSCULAR REEDUCATION: CPT

## 2020-03-06 PROCEDURE — 97016 VASOPNEUMATIC DEVICE THERAPY: CPT

## 2020-03-06 PROCEDURE — 97110 THERAPEUTIC EXERCISES: CPT

## 2020-03-06 NOTE — FLOWSHEET NOTE
The [address] [place] [address],[address],  [address]  Phone: [phone]   Fax:     [phone]    Physical Therapy Daily Treatment Note  Date:  3/6/2020    Patient Name:  Stacy Sumner    :  1955  MRN: [id number]  Restrictions/Precautions:    Medical/Treatment Diagnosis Information:  · Diagnosis: S83.242D (ICD-10-CM) - Tear of medial meniscus of left knee, subsequent encounter  · Treatment Diagnosis: M25.562 Left knee pain   Insurance/Certification information:  PT Insurance Information: Aetna  Physician Information:  Referring Practitioner: Indy Forman DO  Has the plan of care been signed (Y/N):        [x]  Yes  []  No     Date of Patient follow up with Physician: 3/19/20      Is this a Progress Report:     []  Yes  [x]  No        If Yes:  Date Range for reporting period:  Beginning  Ending    Progress report will be due (10 Rx or 30 days whichever is less): 9/10/68       Recertification will be due (POC Duration  / 90 days whichever is less): 20         Visit # Insurance Allowable Auth Required   5  3/6  [employer]na  No limit []  Yes [x]  No        Functional Scale: WOMAC 52% deficit   Date assessed:  20       Latex Allergy:  [x]NO      []YES  Preferred Language for Healthcare:   [x]English       []other:    Pain level:  3/10 3/6     SUBJECTIVE:  3/6 Pt states she continues to have pins/ needles sensation down lateral L thigh especially at night. Has some knee discomfort with pivoting. States she is on her feet a lot throughout out the day. Only using cane for long distances. OBJECTIVE: See eval 20    Observation:Tenderness L distal ITB 3/6   Test measurements:  Circumferential measurements 37 cm L, 36 cm R mid patella.   L knee AROM 0-122 deg, PROM 127 deg flex 3/6      RESTRICTIONS/PRECAUTIONS: S/P L knee menisectomy 19    Exercises/Interventions:   Exercises/Interventions:   Exercise/Equipment Resistance/Repetitions Other comments   Stretching/AROM       Bike AROM x5' 3/4 full rotation obtained   Calf stretch  ^3/4 slantboard    Hamstring  2/19 seated EOT   Quad 30\"hx5 L ^2/28 prone  3/6 cues to avoid hip ER   Heel slide L 2/26 progressed with SB roll ins   SB roll ins  2/26    ITB stretch 30\"hx5 L 3/6 completed passively with hip stabilization    Knee flexion ROM  3/4 see PROM below   Heel raise  2/28 standing cues to avoid hip ER                Strengthening      SLR Flexion 1.5# 3x10 L      Abduction 1.5# 3x10 L  Adduction 1.5# 3x10 L  Extension 1.5# 3x10 L ^3/4 cues to maintain knee ext and avoid hip add  ^3/4  ^3/4  ^3/4    Quad set  2/26 see progression to SAQ   Clams Black TB 3x10 L ^3/4 side lying    Hamstring curls 3.5# 3x10 L ^3/6              Neuromuscular re-ed      SAQ  2/26   Bridge 3x10 ^3/4 black TB around knees d/t bilat hip adduction    LAQ with BS 1# 5\"h 3x10 L ^3/6 stopped at 10 deg knee flexion   TKE  ^3/4   Biodex Random Control L12 x1'  3/6 bilat UE support   Biodex Weight Distribution Emphasized weight distribution in standing and with mini squats 3/6 x3'                Quantum machines       Leg press   80# 3x10 bilat 2/6   Leg extension       Leg curl               Manual interventions        PROM Knee flexion with hip flexion and pressure at distal ITB and distal lat quad for active release x5' 3/6   STM Roller to L ITB and lateral quad x5' 3/6 pt side lying           Therapeutic Exercise and NMR EXR  [x] (23742) Provided verbal/tactile cueing for activities related to strengthening, flexibility, endurance, ROM for improvements in LE, proximal hip, and core control with self care, mobility, lifting, ambulation.  [] (03439) Provided verbal/tactile cueing for activities related to improving balance, coordination, kinesthetic sense, posture, motor skill, proprioception  to assist with LE, proximal hip, and core control in self care, mobility, lifting, ambulation and eccentric VASO  [x] Manual (86771) x  1   [] Other:  [] TA x      [] Mech Traction (10042)  [] ES(attended) (70347)      [] ES (un) (66236):     GOALS:   Patient stated goal: Return to climbing stairs without pain and physical activities     []? Progressing: []? Met: []? Not Met: []? Adjusted     Therapist goals for Patient:   Short Term Goals: To be achieved in: 4 weeks  1. Independent in HEP and progression per patient tolerance, in order to prevent re-injury. []? Progressing: []? Met: []? Not Met: []? Adjusted   2. Patient will have a decrease in pain to facilitate improvement in movement, function, and ADLs as indicated by Functional Deficits. []? Progressing: []? Met: []? Not Met: []? Adjusted     Long Term Goals: To be achieved in: 6 weeks  1. Disability index score of 5% or less for the LEFS to assist with reaching prior level of function. []? Progressing: []? Met: []? Not Met: []? Adjusted  2. Patient will demonstrate increased L knee AROM that is equal to R to allow for proper joint functioning as indicated by patients Functional Deficits. []? Progressing: []? Met: []? Not Met: []? Adjusted  3. Patient will demonstrate an increase in L LE strength to 4/5 in all directions and pain free to allow for proper functional mobility as indicated by patients Functional Deficits. []? Progressing: []? Met: []? Not Met: []? Adjusted  4. Patient will return to all functional activities of daily living without increased symptoms or restriction. []? Progressing: []? Met: []? Not Met: []? Adjusted  5. Patient will be able to ambulate overall 500 ft without assistive device, no abnormalities and no c/o pain. []? Progressing: []? Met: []? Not Met: []? Adjusted       Overall Progression Towards Functional goals/ Treatment Progress Update:  [] Patient is progressing as expected towards functional goals listed. [] Progression is slowed due to complexities/Impairments listed.   [] Progression has been slowed due to co-morbidities. [x] Plan just implemented, too soon to assess goals progression <30days   [] Goals require adjustment due to lack of progress  [] Patient is not progressing as expected and requires additional follow up with physician  [] Other    Prognosis for POC: [x] Good [] Fair  [] Poor      Patient requires continued skilled intervention: [x] Yes  [] No    Treatment/Activity Tolerance:  [x] Patient able to complete treatment  [] Patient limited by fatigue  [] Patient limited by pain     [] Patient limited by other medical complications  [] Other:   3/6 Symptoms at night appear to be related to tightness at L ITB. Required cues with exercises to decrease hip adduction and ER with exercise in order to decrease tightness at L ITB. Required cues with SLR's and unable to increase weight d/t fatigue. Responded well to cues provided with exercises in weight bearing. ROM improving overall. Patient Education:                2/19 Educated on precautions, use of 1 crutch, icing and importance of HEP. [address] access code [email or web address]   2/26 Updated HEP based on progress. Reviewed importance of using 1 crutch based on continued limp to decrease risk of increase in symptoms or falls. 2/28 updated HEP   3/4 Educated on precautions and not overdoing it with daily activities and HEP. 3/6 Educated on activity modification and use of pillow between knees when sleeping. PLAN:   3/6 Monitor symptoms and progress with strengthening and balance in weight bearing as tolerated. Monitor knee ROM. [x] Continue per plan of care [] Alter current plan (see comments above)  [x] Plan of care initiated [] Hold pending MD visit [] Discharge      Electronically signed by:  Jaron Robles PT    Note: If patient does not return for scheduled/ recommended follow up visits, this note will serve as a discharge from care along with most recent update on progress.

## 2020-03-09 ENCOUNTER — HOSPITAL ENCOUNTER (OUTPATIENT)
Dept: PHYSICAL THERAPY | Age: 65
Setting detail: THERAPIES SERIES
Discharge: HOME | End: 2020-03-09
Payer: MEDICARE

## 2020-03-09 PROCEDURE — 97110 THERAPEUTIC EXERCISES: CPT

## 2020-03-09 PROCEDURE — 97140 MANUAL THERAPY 1/> REGIONS: CPT

## 2020-03-09 PROCEDURE — 97112 NEUROMUSCULAR REEDUCATION: CPT

## 2020-03-09 NOTE — FLOWSHEET NOTE
[address], [address] [place] [address]  Phone: [phone]   Fax:     [phone]    Physical Therapy Daily Treatment Note  Date:  3/9/2020    Patient Name:  Eder Bautista    :  1955  MRN: [id number]  Restrictions/Precautions:    Medical/Treatment Diagnosis Information:  · Diagnosis: S83.242D (ICD-10-CM) - Tear of medial meniscus of left knee, subsequent encounter  · Treatment Diagnosis: M25.562 Left knee pain   Insurance/Certification information:  PT Insurance Information: Aetna  Physician Information:  Referring Practitioner: Priscila Barrett DO  Has the plan of care been signed (Y/N):        [x]  Yes  []  No     Date of Patient follow up with Physician: 3/19/20      Is this a Progress Report:     []  Yes  [x]  No        If Yes:  Date Range for reporting period:  Beginning  Ending    Progress report will be due (10 Rx or 30 days whichever is less):        Recertification will be due (POC Duration  / 90 days whichever is less): 20         Visit # Insurance Allowable Auth Required   6  3/9  [employer]  No limit []  Yes [x]  No        Functional Scale: WOMAC 52% deficit   Date assessed:  20       Latex Allergy:  [x]NO      []YES  Preferred Language for Healthcare:   [x]English       []other:    Pain level:  2-310 3/9     SUBJECTIVE:  3/9 Pt states she is feeling much better and this morning is the best its felt in the past 2 weeks. Pt states she had a little stiffness yesterday with standing at [employer] and was able to decrease symptoms with ice and HEP. OBJECTIVE: See eval 20    Observation:Tenderness L distal ITB 3/9   Test measurements:  Circumferential measurements 36 cm L, 36 cm R mid patella.   L knee AROM 0-127 deg 3/9      RESTRICTIONS/PRECAUTIONS: S/P L knee menisectomy 19    Exercises/Interventions:   Exercises/Interventions:   Exercise/Equipment Resistance/Repetitions Other comments   Stretching/AROM       Bike AROM x5' 3/4 full rotation obtained   Calf stretch  ^3/4 slantboard    Hamstring  2/19 seated EOT   Quad  3/9 cont with HEP   Heel slide L 2/26 progressed with SB roll ins   SB roll ins  2/26    ITB stretch  3/9 cont with HEP    Knee flexion ROM  3/4 see PROM below   Heel raise  2/28 standing cues to avoid hip ER                Strengthening      SLR Flexion 1.5# 3x10 L      Abduction 1.5# 3x10 L  Adduction 1.5# 3x10 L  Extension 1.5# 3x10 L ^3/4 cues to maintain knee ext and avoid hip add  ^3/4  ^3/4  ^3/4    Quad set  2/26 see progression to SAQ   Clams Black TB 5\"h 3x10 L ^3/9 side lying    Hamstring curls 4# 3x10 L ^3/9              Neuromuscular re-ed      SAQ  2/26   Bridge 5\"h 3x10 ^3/9 black TB around knees d/t bilat hip adduction    LAQ with BS 2# 5\"h 3x10 L ^3/9 stopped at 10 deg knee flexion   TKE  ^3/4   Biodex Random Control L11 x2'  ^3/9 bilat UE support   Biodex Weight Distribution Emphasized weight distribution in standing and with mini squats 3/6 x3'   Step ups Forward 3x10 L on 4\" step 3/9 educated on proper muscle activation and sequencing. Attempted 6\" step originally but withheld d/t discomfort.  No pain with 4\" stp    SLB  10\"hx10 L 3/9           Quantum machines       Leg press   80# 3x10 bilat ^3/9 eccentric lower on L    Leg extension       Leg curl               Manual interventions        PROM Knee flexion with hip flexion and pressure at distal ITB and distal lat quad for active release x6' 3/9   STM Roller to L ITB and lateral quad x5' 3/9 pt side lying           Therapeutic Exercise and NMR EXR  [x] (24778) Provided verbal/tactile cueing for activities related to strengthening, flexibility, endurance, ROM for improvements in LE, proximal hip, and core control with self care, mobility, lifting, ambulation.  [] (97716) Provided verbal/tactile cueing for activities related to improving balance, coordination, kinesthetic sense, posture, motor skill, proprioception  to assist with LE, proximal hip, and core control in self care, mobility, lifting, ambulation and eccentric single leg control. NMR and Therapeutic Activities:    [] (81176 or 52584) Provided verbal/tactile cueing for activities related to improving balance, coordination, kinesthetic sense, posture, motor skill, proprioception and motor activation to allow for proper function of core, proximal hip and LE with self care and ADLs  [] (88440) Gait Re-education- Provided training and instruction to the patient for proper LE, core and proximal hip recruitment and positioning and eccentric body weight control with ambulation re-education including up and down stairs     Home Exercise Program:    [x] (43132) Reviewed/Progressed HEP activities related to strengthening, flexibility, endurance, ROM of core, proximal hip and LE for functional self-care, mobility, lifting and ambulation/stair navigation   [] (08553)Reviewed/Progressed HEP activities related to improving balance, coordination, kinesthetic sense, posture, motor skill, proprioception of core, proximal hip and LE for self care, mobility, lifting, and ambulation/stair navigation      Manual Treatments:  PROM / STM / Oscillations-Mobs:  G-I, II, III, IV (PA's, Inf., Post.)  [] (45976) Provided manual therapy to mobilize LE, proximal hip and/or LS spine soft tissue/joints for the purpose of modulating pain, promoting relaxation,  increasing ROM, reducing/eliminating soft tissue swelling/inflammation/restriction, improving soft tissue extensibility and allowing for proper ROM for normal function with self care, mobility, lifting and ambulation.      Modalities:  CP L knee x15' d/t decrease swelling 3/9/20    Charges:  Timed Code Treatment Minutes: 61'   Total Treatment Minutes: 11:28-12:48  80'       [] EVAL (LOW) 07759 (typically 20 minutes face-to-face)  [] EVAL (MOD) 14267 (typically 30 minutes face-to-face)  [] EVAL (HIGH) 423 4720 (typically 45 minutes face-to-face)  [] RE-EVAL   [x] HT(25717) x  2   [] IONTO  [x] NMR (63086) x 1    [] VASO  [x] Manual (01558) x  1   [] Other:  [] TA x      [] Mech Traction (52228)  [] ES(attended) (18178)      [] ES (un) (83469):     GOALS:   Patient stated goal: Return to climbing stairs without pain and physical activities     []? Progressing: []? Met: []? Not Met: []? Adjusted     Therapist goals for Patient:   Short Term Goals: To be achieved in: 4 weeks  1. Independent in HEP and progression per patient tolerance, in order to prevent re-injury. []? Progressing: []? Met: []? Not Met: []? Adjusted   2. Patient will have a decrease in pain to facilitate improvement in movement, function, and ADLs as indicated by Functional Deficits. []? Progressing: []? Met: []? Not Met: []? Adjusted     Long Term Goals: To be achieved in: 6 weeks  1. Disability index score of 5% or less for the LEFS to assist with reaching prior level of function. []? Progressing: []? Met: []? Not Met: []? Adjusted  2. Patient will demonstrate increased L knee AROM that is equal to R to allow for proper joint functioning as indicated by patients Functional Deficits. []? Progressing: []? Met: []? Not Met: []? Adjusted  3. Patient will demonstrate an increase in L LE strength to 4/5 in all directions and pain free to allow for proper functional mobility as indicated by patients Functional Deficits. []? Progressing: []? Met: []? Not Met: []? Adjusted  4. Patient will return to all functional activities of daily living without increased symptoms or restriction. []? Progressing: []? Met: []? Not Met: []? Adjusted  5. Patient will be able to ambulate overall 500 ft without assistive device, no abnormalities and no c/o pain. []? Progressing: []? Met: []? Not Met: []? Adjusted       Overall Progression Towards Functional goals/ Treatment Progress Update:  [] Patient is progressing as expected towards functional goals listed.     [] Progression is slowed due to complexities/Impairments listed. [] Progression has been slowed due to co-morbidities. [x] Plan just implemented, too soon to assess goals progression <30days   [] Goals require adjustment due to lack of progress  [] Patient is not progressing as expected and requires additional follow up with physician  [] Other    Prognosis for POC: [x] Good [] Fair  [] Poor      Patient requires continued skilled intervention: [x] Yes  [] No    Treatment/Activity Tolerance:  [x] Patient able to complete treatment  [] Patient limited by fatigue  [] Patient limited by pain     [] Patient limited by other medical complications  [] Other:   3/9 Continues to require cues for proper form with SLR's especially into flexion. Demonstrates knee flexion and hip adduction with fatigue. Pt presented with tightness into knee flexion but improved with manual therapy. Continues to have tenderness at L distal ITB but not as severe as last visit. Patient Education:                2/19 Educated on precautions, use of 1 crutch, icing and importance of HEP. [address] access code [email or web address]   2/26 Updated HEP based on progress. Reviewed importance of using 1 crutch based on continued limp to decrease risk of increase in symptoms or falls. 2/28 updated HEP   3/4 Educated on precautions and not overdoing it with daily activities and HEP. 3/6 Educated on activity modification and use of pillow between knees when sleeping. 3/9 Updated HEP based on progress made in PT. PLAN:   3/9 Monitor symptoms and progress with strengthening and balance in weight bearing as tolerated. Monitor knee ROM.    [x] Continue per plan of care [] Alter current plan (see comments above)  [x] Plan of care initiated [] Hold pending MD visit [] Discharge      Electronically signed by:  Maru Pope PT    Note: If patient does not return for scheduled/ recommended follow up visits, this note will serve as a discharge from care along with most recent

## 2020-03-11 ENCOUNTER — HOSPITAL ENCOUNTER (OUTPATIENT)
Dept: PHYSICAL THERAPY | Age: 65
Setting detail: THERAPIES SERIES
Discharge: HOME | End: 2020-03-11
Payer: MEDICARE

## 2020-03-11 PROCEDURE — 97140 MANUAL THERAPY 1/> REGIONS: CPT

## 2020-03-11 PROCEDURE — 97112 NEUROMUSCULAR REEDUCATION: CPT

## 2020-03-11 PROCEDURE — 97110 THERAPEUTIC EXERCISES: CPT

## 2020-03-11 NOTE — FLOWSHEET NOTE
[place] [id number] [address]  Phone: [phone]   Fax:     [phone]    Physical Therapy Daily Treatment Note  Date:  3/11/2020    Patient Name:  Ayanna Reyes    :  1955  MRN: [id number]  Restrictions/Precautions:    Medical/Treatment Diagnosis Information:  · Diagnosis: S83.242D (ICD-10-CM) - Tear of medial meniscus of left knee, subsequent encounter  · Treatment Diagnosis: M25.562 Left knee pain   Insurance/Certification information:  PT Insurance Information: Aetna  Physician Information:  Referring Practitioner: Rocco Garcia DO  Has the plan of care been signed (Y/N):        [x]  Yes  []  No     Date of Patient follow up with Physician: 3/19/20      Is this a Progress Report:     []  Yes  [x]  No        If Yes:  Date Range for reporting period:  Beginning  Ending    Progress report will be due (10 Rx or 30 days whichever is less):        Recertification will be due (POC Duration  / 90 days whichever is less): 20         Visit # Insurance Allowable Auth Required   7  3/11  Aetna  No limit []  Yes [x]  No        Functional Scale: WOMAC 52% deficit   Date assessed:  20       Latex Allergy:  [x]NO      []YES  Preferred Language for Healthcare:   [x]English       []other:    Pain level:  2-3/10 3/11     SUBJECTIVE:  3/11 Pt states she is feeling much better since last visit. Sleeping better and using pillow between her legs. Continues to have some discomfort with stairs.      OBJECTIVE: See eval 20    Observation:    Test measurements: AROM 0-127 deg start of treatment 0-130 after manual therapy 3/11      RESTRICTIONS/PRECAUTIONS: S/P L knee menisectomy 19    Exercises/Interventions:   Exercises/Interventions:   Exercise/Equipment Resistance/Repetitions Other comments   Stretching/AROM       Bike AROM x5' 3/4 full rotation obtained   Calf stretch  ^3/4 slantboard Hamstring  2/19 seated EOT   Quad  3/9 cont with HEP   Heel slide L 2/26 progressed with SB roll ins   SB roll ins  2/26    ITB stretch  3/9 cont with HEP    Knee flexion ROM  3/4 see PROM below   Heel raise  2/28 standing cues to avoid hip ER                Strengthening      SLR Flexion 2# 3x10 L  Abduction 2# 3x10 L  Adduction 2# 3x10 L  Extension 2# 3x10 L ^3/11    Quad set  2/26 see progression to  Home	[place] Black TB 5\"h 3x10 L ^3/9 side lying    Hamstring curls 5# 3x10 L ^3/11              Neuromuscular re-ed      SAQ  2/26   Bridge 5\"h 3x10 ^3/9 black TB around knees d/t bilat hip adduction    LAQ with BS  ^3/9 stopped at 10 deg knee flexion   TKE  ^3/4   Biodex Random Control L11 x3'  ^3/11 bilat UE support   Biodex Weight Distribution  3/11 see TRX squats   TRX squats 3x10 black TB @ knees 3/11 mini squat, use of TB to emphasize glut activation and decrease knee valgus    Step ups Forward 3x10 L on 6\" step  Lateral 3x10 L on 4\" step ^3/11   SLB  10\"hx10 L ^3/11 airex    SLR + 10\"hx5 L 3/11                Quantum machines       Leg press   85# 3x10 bilat ^3/11 eccentric lower on L    Leg extension       Leg curl               Manual interventions        PROM Knee flexion with hip flexion and pressure at distal ITB and distal lat quad for active release x8' 3/11   [facility]  3/11 Withheld d/t decrease tenderness/ tightness. Therapeutic Exercise and NMR EXR  [x] (13730) Provided verbal/tactile cueing for activities related to strengthening, flexibility, endurance, ROM for improvements in LE, proximal hip, and core control with self care, mobility, lifting, ambulation.  [] (79765) Provided verbal/tactile cueing for activities related to improving balance, coordination, kinesthetic sense, posture, motor skill, proprioception  to assist with LE, proximal hip, and core control in self care, mobility, lifting, ambulation and eccentric single leg control.      NMR and Therapeutic Activities:    [] (14757 or 31587) Provided verbal/tactile cueing for activities related to improving balance, coordination, kinesthetic sense, posture, motor skill, proprioception and motor activation to allow for proper function of core, proximal hip and LE with self care and ADLs  [] (86771) Gait Re-education- Provided training and instruction to the patient for proper LE, core and proximal hip recruitment and positioning and eccentric body weight control with ambulation re-education including up and down stairs     Home Exercise Program:    [x] (27170) Reviewed/Progressed HEP activities related to strengthening, flexibility, endurance, ROM of core, proximal hip and LE for functional self-care, mobility, lifting and ambulation/stair navigation   [] (21258)Reviewed/Progressed HEP activities related to improving balance, coordination, kinesthetic sense, posture, motor skill, proprioception of core, proximal hip and LE for self care, mobility, lifting, and ambulation/stair navigation      Manual Treatments:  PROM / STM / Oscillations-Mobs:  G-I, II, III, IV (PA's, Inf., Post.)  [] (38768) Provided manual therapy to mobilize LE, proximal hip and/or LS spine soft tissue/joints for the purpose of modulating pain, promoting relaxation,  increasing ROM, reducing/eliminating soft tissue swelling/inflammation/restriction, improving soft tissue extensibility and allowing for proper ROM for normal function with self care, mobility, lifting and ambulation.      Modalities:  CP L knee x15' 3/11/20    Charges:  Timed Code Treatment Minutes: 62'   Total Treatment Minutes: 2:02-3:19  77'       [] EVAL (LOW) 20278 (typically 20 minutes face-to-face)  [] EVAL (MOD) 95130 (typically 30 minutes face-to-face)  [] EVAL (HIGH) 14246 (typically 45 minutes face-to-face)  [] RE-EVAL   [x] RQ(63243) x  2   [] IONTO  [x] NMR (35167) x 1    [] VASO  [x] Manual (68909) x  1   [] Other:  [] TA x      [] Mech Traction (54593)  [] ES(attended) (21310)      [] ES (un) lack of progress  [] Patient is not progressing as expected and requires additional follow up with physician  [] Other    Prognosis for POC: [x] Good [] Fair  [] Poor      Patient requires continued skilled intervention: [x] Yes  [] No    Treatment/Activity Tolerance:  [x] Patient able to complete treatment  [] Patient limited by fatigue  [] Patient limited by pain     [] Patient limited by other medical complications  [] Other:   3/11 Presented with tightness and ROM restriction into flexion on L compared to R but improved with manual therapy. Continues to have end range restriction into flexion. Fatigued with increase in intensity of strengthening exercises and continues to require cues to maintain appropriate form but no increase in symptoms noted. Patient Education:                2/19 Educated on precautions, use of 1 crutch, icing and importance of HEP. [address] access code [email or web address]   2/26 Updated HEP based on progress. Reviewed importance of using 1 crutch based on continued limp to decrease risk of increase in symptoms or falls. 2/28 updated HEP   3/4 Educated on precautions and not overdoing it with daily activities and HEP. 3/6 Educated on activity modification and use of pillow between knees when sleeping. 3/9 Updated HEP based on progress made in PT. PLAN:   3/11 Monitor symptoms and progress with strengthening and balance in weight bearing as tolerated. Monitor knee ROM. [x] Continue per plan of care [] Alter current plan (see comments above)  [x] Plan of care initiated [] Hold pending MD visit [] Discharge      Electronically signed by:  Sherri Dubois, PT    Note: If patient does not return for scheduled/ recommended follow up visits, this note will serve as a discharge from care along with most recent update on progress.

## 2020-03-13 ENCOUNTER — APPOINTMENT (OUTPATIENT)
Dept: PHYSICAL THERAPY | Age: 65
End: 2020-03-13
Payer: MEDICARE

## 2020-03-16 ENCOUNTER — HOSPITAL ENCOUNTER (OUTPATIENT)
Dept: PHYSICAL THERAPY | Age: 65
Setting detail: THERAPIES SERIES
Discharge: HOME | End: 2020-03-16
Payer: MEDICARE

## 2020-03-16 PROCEDURE — 97016 VASOPNEUMATIC DEVICE THERAPY: CPT

## 2020-03-16 PROCEDURE — 97110 THERAPEUTIC EXERCISES: CPT

## 2020-03-16 PROCEDURE — 97112 NEUROMUSCULAR REEDUCATION: CPT

## 2020-03-16 NOTE — FLOWSHEET NOTE
4+/5 medial joint line pain 5/5     Hamstring 4/5 medial joint line  5/5     Hip  flexion 4/5   4/5     Hip abd 4/5 4/5     Hip add 4-/5 4/5     Hip ext  4/5 4/5                 Girth L R   Mid Patella 37 cm 36 cm      Reflexes/Sensation:               [x]? Dermatomes/Myotomes intact               []?Reflexes equal and normal bilaterally              []?Other:     Joint mobility: Patella              [x]? Normal                       []?Hypo              []?Hyper     Palpation: Pes anserine L, Bilat incision sites on L, medial femoral condyle on L      Functional Mobility/Transfers: Bilat UE assist required with sit <> stand     Posture: Bilat knee genu valgus      Bandages/Dressings/Incisions: Incisions at joint line closed with no active exudate or s/s of infection.      Gait: No assistive device, WNL      Orthopedic Special Tests:   SLB mild LE instability bilat for 10 seconds, medial joint line discomfort reported on L       RESTRICTIONS/PRECAUTIONS: S/P L knee menisectomy 2/4/19    Exercises/Interventions:   Exercises/Interventions:   Exercise/Equipment Resistance/Repetitions Other comments   Stretching/AROM       Bike AROM x5' 3/4 full rotation obtained   Calf stretch  ^3/4 slantboard    Hamstring  2/19 seated EOT   Quad 30\"hx5 L 3/16 reintroduced d/t tightness noted @ start of treatment   Heel slide L 2/26 progressed with SB roll ins   SB roll ins 10\"hx10  3/16 reintroduced d/t increase tightness into flexion end of treatment   ITB stretch  3/9 cont with HEP    Knee flexion ROM  3/4 see PROM below   Heel raise  2/28 standing cues to avoid hip ER                Strengthening      SLR Flexion 2# 3x10 L  Abduction 2# 3x10 L  Adduction 2# 3x10 L  Extension 2# 3x10 L ^3/11  3/16 cues continued to be required to avoid knee flexion    Quad set  2/26 see progression to SAQ   [place] Black TB 5\"h 3x10 L ^3/9 side lying    Hamstring curls 5# 3x10 L ^3/11              Neuromuscular re-ed      SAQ  2/26   Bridge 5\"h 3x10 achieved in: 6 weeks  1. Disability index score of 5% or less for the LEFS to assist with reaching prior level of function. [x]? Progressing: []? Met: []? Not Met: []? Adjusted  2. Patient will demonstrate increased L knee AROM that is equal to R to allow for proper joint functioning as indicated by patients Functional Deficits. [x]? Progressing: []? Met: []? Not Met: []? Adjusted  3. Patient will demonstrate an increase in L LE strength to 4/5 in all directions and pain free to allow for proper functional mobility as indicated by patients Functional Deficits. [x]? Progressing: []? Met: []? Not Met: []? Adjusted  4. Patient will return to all functional activities of daily living without increased symptoms or restriction. [x]? Progressing: []? Met: []? Not Met: []? Adjusted  5. Patient will be able to ambulate overall 500 ft without assistive device, no abnormalities and no c/o pain. [x]? Progressing: []? Met: []? Not Met: []? Adjusted       Overall Progression Towards Functional goals/ Treatment Progress Update:  [x] Patient is progressing as expected towards functional goals listed with increase in knee ROM and strength allowing for improvements with gait and functional activities. Continues to have strength deficits causing swelling nad pain when on her feet for prolonged periods of time. Based on improvements and continued limitations she would benefit from further PT.    [] Progression is slowed due to complexities/Impairments listed. [] Progression has been slowed due to co-morbidities.   [] Plan just implemented, too soon to assess goals progression <30days   [] Goals require adjustment due to lack of progress  [] Patient is not progressing as expected and requires additional follow up with physician  [] Other    Prognosis for POC: [x] Good [] Fair  [] Poor      Patient requires continued skilled intervention: [x] Yes  [] No    Treatment/Activity Tolerance:  [x] Patient able to complete treatment [] Patient limited by fatigue  [] Patient limited by pain     [] Patient limited by other medical complications  [] Other:   3/16 Arrived with mild increase in swelling and tightness into knee flexion d/t increase time in weight bearing. ROM improved with stretches. Fatigued with exercises but no adverse effects. Reported decrease in symptoms by end of treatment. Patient Education:                2/19 Educated on precautions, use of 1 crutch, icing and importance of HEP. [address] access code [email or web address]   2/26 Updated HEP based on progress. Reviewed importance of using 1 crutch based on continued limp to decrease risk of increase in symptoms or falls. 2/28 updated HEP   3/4 Educated on precautions and not overdoing it with daily activities and HEP. 3/6 Educated on activity modification and use of pillow between knees when sleeping. 3/9 Updated HEP based on progress made in PT.   3/16 Updated HEP and reviewed precautions as well as activity modification to decrease symptoms. [place]on Ruff PLAN:   3/16 Monitor symptoms and progress with strengthening and balance in weight bearing as tolerated. Monitor knee ROM. [x] Continue per plan of care [] Alter current plan (see comments above)  [x] Plan of care initiated [] Hold pending MD visit [] Discharge      Electronically signed by:  Vicenta Blunt PT    Note: If patient does not return for scheduled/ recommended follow up visits, this note will serve as a discharge from care along with most recent update on progress.

## 2020-03-18 ENCOUNTER — HOSPITAL ENCOUNTER (OUTPATIENT)
Dept: PHYSICAL THERAPY | Age: 65
Setting detail: THERAPIES SERIES
Discharge: HOME | End: 2020-03-18
Payer: MEDICARE

## 2020-03-18 NOTE — FLOWSHEET NOTE
Physical Therapy  Cancellation/No-show Note  Patient Name:  Joel Langston  :  1955   Date:  3/18/2020  Cancelled visits to date: 1  No-shows to date: 0    For today's appointment patient:  [x]  Cancelled  []  Rescheduled appointment  []  No-show     Reason given by patient:  []  Patient ill  []  Conflicting appointment  []  No transportation    []  Conflict with work  []  No reason given  []  Other:     Comments:  Pt cancelled reports practicing social distancing d/t COVID-19. Called pt and reviewed HEP as well as activity modification in weight bearing and with stairs to continue to promote healing to the knee and decrease symptoms.     Electronically signed by:  Phil Holman PT

## 2020-03-19 ENCOUNTER — OFFICE VISIT (OUTPATIENT)
Dept: ORTHOPEDIC SURGERY | Age: 65
End: 2020-03-19

## 2020-03-19 PROBLEM — M17.12 PRIMARY OSTEOARTHRITIS OF LEFT KNEE: Status: ACTIVE | Noted: 2020-03-19

## 2020-03-19 PROCEDURE — 99024 POSTOP FOLLOW-UP VISIT: CPT | Performed by: ORTHOPAEDIC SURGERY

## 2020-03-19 NOTE — PROGRESS NOTES
HISTORY OF PRESENT ILLNESS:   S/P Knee Arthroscopy  The Patient returns today for their postoperative visit after left knee arthroscopy. Pain control has been satisfactory with oral medications. There have been no fevers or chills. PHYSICAL EXAMINATION: Left knee   Inspection reveals expected swelling. Portal sites are clean and dry. The skin is warm. Range of motion is limited by pain and swelling. There is no calf pain  Homans sign is negative. Examination of the contralateral knee reveals warm skin, range of motion within normal limits, good quadriceps bulk, tone and strength, no tenderness to palpation, stable cruciate and collateral ligaments, and no joint line tenderness. Examination of the Patients Lumbar spine revealsThe skin is warm and dry. There is no swelling, warmth, or erythema. Range of motion is within normal limits. There is no paraspinal or spinous process tenderness. Ipsilateral and contralateral straight leg raising tests are negative. The distal neurovascular exam is grossly intact and symmetric. ASSESSMENT/PLAN:   The patient is doing well after knee arthroscopy. I have recommended ice,judicious use of NSAIDs, and physical therapy to diminish swelling and restore both motion and strength. I cautioned against overusing the knee, and they will schedule a reevaluation in a few weeks as soon as we get Visco supplementation approved I would like to inject her left knee in the meantime she is going to do some exercises for her lumbar spine because she is having some left lower extremity radiculopathy  They verbalized good understanding of the plan. Disclaimer: \"This note was dictated with voice recognition software. Though review and correction are routine, we apologize for any errors. \"

## 2020-03-20 ENCOUNTER — TELEPHONE (OUTPATIENT)
Dept: PHYSICAL THERAPY | Age: 65
End: 2020-03-20

## 2020-03-23 ENCOUNTER — APPOINTMENT (OUTPATIENT)
Dept: PHYSICAL THERAPY | Age: 65
End: 2020-03-23
Payer: MEDICARE

## 2020-03-25 ENCOUNTER — APPOINTMENT (OUTPATIENT)
Dept: PHYSICAL THERAPY | Age: 65
End: 2020-03-25
Payer: MEDICARE

## 2020-03-27 ENCOUNTER — APPOINTMENT (OUTPATIENT)
Dept: PHYSICAL THERAPY | Age: 65
End: 2020-03-27
Payer: MEDICARE

## 2024-01-09 ENCOUNTER — OFFICE VISIT (OUTPATIENT)
Dept: CARDIOLOGY CLINIC | Age: 69
End: 2024-01-09
Payer: MEDICARE

## 2024-01-09 VITALS
SYSTOLIC BLOOD PRESSURE: 132 MMHG | DIASTOLIC BLOOD PRESSURE: 90 MMHG | BODY MASS INDEX: 26.29 KG/M2 | HEIGHT: 64 IN | HEART RATE: 65 BPM | WEIGHT: 154 LBS

## 2024-01-09 DIAGNOSIS — R06.02 SOB (SHORTNESS OF BREATH): ICD-10-CM

## 2024-01-09 DIAGNOSIS — R07.9 CHEST PAIN, UNSPECIFIED TYPE: Primary | ICD-10-CM

## 2024-01-09 PROCEDURE — 93000 ELECTROCARDIOGRAM COMPLETE: CPT | Performed by: INTERNAL MEDICINE

## 2024-01-09 PROCEDURE — 99204 OFFICE O/P NEW MOD 45 MIN: CPT | Performed by: INTERNAL MEDICINE

## 2024-01-09 RX ORDER — ALBUTEROL SULFATE 90 UG/1
AEROSOL, METERED RESPIRATORY (INHALATION)
COMMUNITY
Start: 2023-12-02 | End: 2024-01-09 | Stop reason: ALTCHOICE

## 2024-01-09 RX ORDER — OMEPRAZOLE 40 MG/1
40 CAPSULE, DELAYED RELEASE ORAL DAILY
COMMUNITY
Start: 2024-01-03

## 2024-01-09 RX ORDER — LANOLIN ALCOHOL/MO/W.PET/CERES
1 CREAM (GRAM) TOPICAL DAILY
COMMUNITY

## 2024-01-09 RX ORDER — ATORVASTATIN CALCIUM 20 MG/1
20 TABLET, FILM COATED ORAL DAILY
COMMUNITY
Start: 2023-10-27

## 2024-01-09 RX ORDER — B COMPLX/C/FOLIC/ZINC/CUP SU/E 500-0.4 MG
1 TABLET ORAL DAILY
COMMUNITY
Start: 2023-12-04

## 2024-01-09 NOTE — PROGRESS NOTES
[facility]     Outpatient Cardiology Consult  Consulting Cardiologist Shant Han MD, [facility]  Referring Provider:  Pam Silva MD    1/9/2024,2:02 PM    Chief Complaint   Patient presents with    New Patient     Chest Pain            Asked by No admitting provider for patient encounter./Pam Silva MD  to evaluate and assess this patient's chest pain pressure    History of Present Illness: Loreta Campoverde is a 68 y.o. female about to be 69 in a few days is here for evaluation of her heart related to some chest pressure and pain over the past several weeks.  About 3 to 4 months ago had some vague pressure and heaviness in her chest that lasted for a few minutes and finally went away.  About 2 weeks ago it occurred with chest pressure primarily while lying down and felt like someone sitting on the chest and with pain down her left arm.  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  Subsequently he has more pain over the last 3 days primarily while lying supine lasting for about 20 to 30 minutes without associated diaphoresis or nausea or vomiting.  No known coronary disease.    Hypertension  Hyperlipidemia with LDL 68 on Lipitor 20  Social she does not smoke and does not drink.    Has her 91-year-old mother who lives with her.              Past Medical History:   has a past medical history of Ankle fracture, lateral malleolus, closed, Ankle pain, BPPV (benign paroxysmal positional vertigo), Dizziness and giddiness, Dysphagia, Fibroids, Hx of esophageal spasm, Insomnia, unspecified, Meniere's disease, unspecified, Osteopenia, and Other chest pain.    Surgical History:   has a past surgical history that includes Colonoscopy (2007); Upper gastrointestinal endoscopy (2009); and Knee arthroscopy (Left, 2/4/2020).     Social History:   reports that she has never smoked. She has never used smokeless tobacco. She reports that she does not currently

## 2024-01-24 ENCOUNTER — PROCEDURE VISIT (OUTPATIENT)
Dept: CARDIOLOGY CLINIC | Age: 69
End: 2024-01-24
Payer: MEDICARE

## 2024-01-24 DIAGNOSIS — R07.89 OTHER CHEST PAIN: Primary | ICD-10-CM

## 2024-01-24 PROCEDURE — 93306 TTE W/DOPPLER COMPLETE: CPT | Performed by: INTERNAL MEDICINE

## 2024-03-29 ENCOUNTER — OFFICE VISIT (OUTPATIENT)
Dept: CARDIOLOGY CLINIC | Age: 69
End: 2024-03-29

## 2024-03-29 ENCOUNTER — TELEPHONE (OUTPATIENT)
Dept: CARDIOLOGY CLINIC | Age: 69
End: 2024-03-29

## 2024-03-29 DIAGNOSIS — R06.02 SOB (SHORTNESS OF BREATH): ICD-10-CM

## 2024-03-29 DIAGNOSIS — R07.9 CHEST PAIN, UNSPECIFIED TYPE: ICD-10-CM

## 2024-03-29 DIAGNOSIS — I10 ESSENTIAL HYPERTENSION: Primary | ICD-10-CM

## 2024-03-29 DIAGNOSIS — E78.5 HYPERLIPIDEMIA, UNSPECIFIED HYPERLIPIDEMIA TYPE: Primary | ICD-10-CM

## 2024-04-03 ENCOUNTER — TELEPHONE (OUTPATIENT)
Dept: CARDIOLOGY CLINIC | Age: 69
End: 2024-04-03

## 2024-04-04 ENCOUNTER — HOSPITAL ENCOUNTER (OUTPATIENT)
Dept: CT IMAGING | Age: 69
Discharge: HOME | End: 2024-04-04
Payer: MEDICARE

## 2024-04-04 VITALS — DIASTOLIC BLOOD PRESSURE: 75 MMHG | SYSTOLIC BLOOD PRESSURE: 137 MMHG | HEART RATE: 59 BPM

## 2024-04-04 DIAGNOSIS — R06.02 SOB (SHORTNESS OF BREATH): ICD-10-CM

## 2024-04-04 DIAGNOSIS — R07.9 CHEST PAIN, UNSPECIFIED TYPE: ICD-10-CM

## 2024-04-04 PROCEDURE — 6370000000 HC RX 637 (ALT 250 FOR IP): Performed by: RADIOLOGY

## 2024-04-04 PROCEDURE — 75574 CT ANGIO HRT W/3D IMAGE: CPT

## 2024-04-04 PROCEDURE — 6360000004 HC RX CONTRAST MEDICATION: Performed by: INTERNAL MEDICINE

## 2024-04-04 RX ORDER — NITROGLYCERIN 0.4 MG/1
0.8 TABLET SUBLINGUAL ONCE
Status: COMPLETED | OUTPATIENT
Start: 2024-04-04 | End: 2024-04-04

## 2024-04-04 RX ADMIN — NITROGLYCERIN 0.8 MG: 0.4 TABLET, ORALLY DISINTEGRATING SUBLINGUAL at 13:28

## 2024-04-04 RX ADMIN — IOPAMIDOL 75 ML: 755 INJECTION, SOLUTION INTRAVENOUS at 13:35

## 2024-04-04 NOTE — PROGRESS NOTES
Pt here for Cardiac CTA test.  Administered 0.8mg nitroglycerin sublingual for exam.  Pt tolerated well.  VSS. See flow sheet.  Reviewed Cardiac CTA discharge instructions with pt - verbalized understanding, no further questions. Pt discharged to home.

## 2024-04-04 NOTE — DISCHARGE INSTRUCTIONS
Thank You for choosing [facility] for your medical care.    During your examination, you were given a Beta Blocker called Metopropol or Lopressor to help slow down your heart rate. The dose of the medication you were given was 2 sublingual nitroglycerin.    Although there are few side effects from this medication when given in small amounts (doses) it is important you follow a few important instructions:    Notify the doctor that ordered your test or go to the nearest emergency room if you experience any of the following:  difficulty breathing  dizziness  extreme fatigue  pounding or irregular heart beat    Continue your usual diet, increase fluids today.  (Four 8 ounce glasses of water).                    4.You may return back to your normal activity and routine tomorrow.                Test results will be sent to your Physician.    If you take Actoplus Met, Avandamet, Glucophage, Glucophage XR, Glucovance, Metformin, Metaglip, Riomet, or Fortmet hold medication for 48 hours after procedure and resum

## 2024-04-11 ENCOUNTER — TELEPHONE (OUTPATIENT)
Dept: CARDIOLOGY CLINIC | Age: 69
End: 2024-04-11

## 2024-04-11 NOTE — TELEPHONE ENCOUNTER
Called patient and she stated that she is having a colonoscopy this coming Monday April 15,2024 and needs to know if she could be cleared. I will speak to Carolyn in the morning and see if we can clear her.

## 2024-04-11 NOTE — TELEPHONE ENCOUNTER
Patient called wanting a call back from  or MISTY De Los Santos.   Patient would not disclose what she needed to discuss other than she wanted to speak with a nurse or doctor.. patients cell # [phone]

## 2024-08-26 ENCOUNTER — OFFICE VISIT (OUTPATIENT)
Dept: CARDIOLOGY CLINIC | Age: 69
End: 2024-08-26
Payer: MEDICARE

## 2024-08-26 VITALS
HEIGHT: 64 IN | DIASTOLIC BLOOD PRESSURE: 76 MMHG | WEIGHT: 155 LBS | HEART RATE: 72 BPM | BODY MASS INDEX: 26.46 KG/M2 | SYSTOLIC BLOOD PRESSURE: 100 MMHG

## 2024-08-26 DIAGNOSIS — E78.5 HYPERLIPIDEMIA LDL GOAL <70: Primary | ICD-10-CM

## 2024-08-26 DIAGNOSIS — R07.9 CHEST PAIN, UNSPECIFIED TYPE: ICD-10-CM

## 2024-08-26 PROCEDURE — 1123F ACP DISCUSS/DSCN MKR DOCD: CPT | Performed by: INTERNAL MEDICINE

## 2024-08-26 PROCEDURE — 99214 OFFICE O/P EST MOD 30 MIN: CPT | Performed by: INTERNAL MEDICINE

## 2024-08-26 NOTE — PROGRESS NOTES
[facility] [place]   Dr Shant Han MD, [facility]    Outpatient Follow Up Note    8/26/2024,11:56 AM  Subjective:   CHIEF COMPLAINT / HPI:  Follow Up secondary to chest discomfort hypertension     Loreta Campoverde is 69 y.o. female who presents today 8/26/2024 for follow-up and generally doing well.  She did have a visit in January and we were concerned to rule out CAD we did order a coronary CT angiogram.  It was performed on 4/4/2024 and showed 0 calcium score and normal coronary anatomy and structure and no evidence for coronary stenoses.  From our perspective she is looking well and not having any symptoms.  Her blood pressure is stable at 100/76 pulse of 72 and her last LDL is down to 62 on Lipitor 20.  Will recommend she continue same medications..        Hypertension  Hyperlipidemia with LDL 68 on Lipitor 20  Social she does not smoke and does not drink.     Has her 92-year-old mother who lives with her.        Past Medical History:    Past Medical History:   Diagnosis Date    Ankle fracture, lateral malleolus, closed 6/29/2010    Dr. Griffin    Ankle pain     Chronic, Dr. Joni Snyder    BPPV (benign paroxysmal positional vertigo)     Dizziness and giddiness     Dysphagia     Fibroids 1987    Hx of esophageal spasm     Insomnia, unspecified     Meniere's disease, unspecified     Osteopenia     Other chest pain      Past Surgical History  Past Surgical History:   Procedure Laterality Date    COLONOSCOPY  2007    KNEE ARTHROSCOPY Left 2/4/2020    LEFT KNEE ARTHROSCOPE, PARTIAL MEDIAL AND LATERAL MENISCECTOMY, LOOSE BODIES REMOVAL, SYNOVECTOMY, CHONDROPLASTY  (90116, 21622) performed by Segundo Trejo DO at [facility] OR    UPPER GASTROINTESTINAL ENDOSCOPY  2009     Social History:       Social History     Tobacco Use   Smoking Status Never   Smokeless Tobacco Never     Current Medications:  Prior to Visit Medications    Medication Sig Taking? Authorizing Provider   Ascorbic    PHYSICAL EXAM:        VITALS:    Wt Readings from Last 3 Encounters:   08/26/24 70.3 kg (155 lb)   01/09/24 69.9 kg (154 lb)   02/13/20 70.3 kg (154 lb 15.7 oz)     BP Readings from Last 3 Encounters:   08/26/24 100/76   04/04/24 137/75   01/09/24 (!) 132/90     Pulse Readings from Last 3 Encounters:   08/26/24 72   04/04/24 59   01/09/24 65       CONSTITUTIONAL: Cooperative, no apparent distress, and appears well nourished / developed  NEUROLOGIC:  Awake and orientated to person, place and time.  PSYCH: Calm affect.  SKIN: Warm and dry.  HEENT: Sclera non-icteric, normocephalic, neck supple, no elevation of JVP, normal carotid pulses with no bruits and thyroid normal size.  LUNGS:  No increased work of breathing and clear to auscultation, no crackles or wheezing  CARDIOVASCULAR:  Regular rate and rhythm with no murmurs, gallops, rubs, or abnormal heart sounds, normal PMI.The apical impulses not displaced  Heart tones are crisp and normal  Cervical veins are not engorged  The carotid upstroke is normal in amplitude and contour without delay or bruit  JVP is not elevated  ABDOMEN:  Normal bowel sounds, non-distended and non-tender to palpation  EXT: No edema, no calf tenderness. Pulses are present bilaterally.    DATA:    Lab Results   Component Value Date    ALT 18 05/23/2011    AST 21 05/23/2011    ALKPHOS 76 05/23/2011    BILITOT 0.40 05/23/2011     Lab Results   Component Value Date    CREATININE 0.9 05/23/2011    BUN 13 05/23/2011     05/23/2011    K 4.6 05/23/2011     05/23/2011    CO2 27 05/23/2011     Lab Results   Component Value Date    TSH 1.39 05/23/2011     Lab Results   Component Value Date    WBC 4.4 05/23/2011    HGB 12.3 05/23/2011    HCT 38.7 05/23/2011    MCV 84.1 05/23/2011     05/23/2011     No components found for: \"CHLPL\"  Lab Results   Component Value Date    TRIG 101 05/23/2011     Lab Results   Component Value Date    HDL 61 (H) 05/23/2011     No components found for:

## 2024-08-30 NOTE — H&P
Office Visit    8/26/2024  [facility]       Shant Han MD  Cardiology Hyperlipidemia LDL goal <70 +1 more  Dx Results   Reason for Visit     Progress Notes  Shant Han MD (Physician)  Cardiology  Expand All Collapse All  [facility]   Dr Shant Han MD, [facility] Office- [place]    Outpatient Follow Up Note     8/26/2024,11:56 AM  Subjective:   CHIEF COMPLAINT / HPI:  Follow Up secondary to chest discomfort hypertension                 Loreta Campoverde is 69 y.o. female who presents today 8/26/2024 for follow-up and generally doing well.  She did have a visit in January and we were concerned to rule out CAD we did order a coronary CT angiogram.  It was performed on 4/4/2024 and showed 0 calcium score and normal coronary anatomy and structure and no evidence for coronary stenoses.  From our perspective she is looking well and not having any symptoms.  Her blood pressure is stable at 100/76 pulse of 72 and her last LDL is down to 62 on Lipitor 20.  Will recommend she continue same medications..                   Hypertension  Hyperlipidemia with LDL 68 on Lipitor 20  Social she does not smoke and does not drink.     Has her 92-year-old mother who lives with her.                    Past Medical History:    Past Medical History        Past Medical History:   Diagnosis Date    Ankle fracture, lateral malleolus, closed 6/29/2010     Dr. Griffin    Ankle pain       Chronic, Dr. Joni Snyder    BPPV (benign paroxysmal positional vertigo)      Dizziness and giddiness      Dysphagia      Fibroids 1987    Hx of esophageal spasm      Insomnia, unspecified      Meniere's disease, unspecified      Osteopenia      Other chest pain           Past Surgical History  Past Surgical History         Past Surgical History:   Procedure Laterality Date    COLONOSCOPY   2007    KNEE ARTHROSCOPY Left 2/4/2020     LEFT KNEE ARTHROSCOPE, PARTIAL MEDIAL AND LATERAL MENISCECTOMY, LOOSE  Date     CREATININE 0.9 05/23/2011     BUN 13 05/23/2011      05/23/2011     K 4.6 05/23/2011      05/23/2011     CO2 27 05/23/2011            Lab Results   Component Value Date     TSH 1.39 05/23/2011            Lab Results   Component Value Date     WBC 4.4 05/23/2011     HGB 12.3 05/23/2011     HCT 38.7 05/23/2011     MCV 84.1 05/23/2011      05/23/2011      No components found for: \"CHLPL\"        Lab Results   Component Value Date     TRIG 101 05/23/2011            Lab Results   Component Value Date     HDL 61 (H) 05/23/2011      No components found for: \"LDLCALC\"  No components found for: \"LABVLDL\"  Radiology Review:  Pertinent images / reports were reviewed as a part of this visit and reveals the following:     Echo:     Stress Test / Angiogram:  Non coronary findings : Small/moderate hiatal hernia. 4/4/24      Coronary dominance : Right     Coronary arteries     RCA : normal     Left main : normal     Circumflex and Obtuse Marginals : normal     LAD and Diagonals : normal     IMPRESSION:  IMPRESSION :     No atherosclerotic plaque and no coronary artery stenosis.  Calcium score 0     CAD-RADS score 0     ECG:  This patient was educated using the patient point room wall mount device. Absence from smokers and smoking and diet and exercising are important.  Assessment:   Atypical chest pains.  Hyperlipidemia on Lipitor 20.     Plan:   Continue current therapy.  Return to see us in 1 year.  Please call if we can assist further [phone].  Shant Han MD, [facility]        This note was likely completed using voice recognition technology and may contain unintended errors        Instructions    AVS (Automatic SnapShot taken 8/30/2024)  Additional Documentation    Vitals: /76     Pulse 72     Ht 1.626 m (5' 4\")     Wt 70.3 kg (155 lb)     BMI 26.61 kg/m²     BSA 1.78 m²          More Vitals   Flowsheets: Calorie Assessment,     Quick Vitals   Encounter Info: Allergies,     Detailed

## (undated) DEVICE — 3.5 MM FULL RADIUS ELITE STRAIGHT                                    DISPOSABLE BLADES, BEIGE,PACKAGED 6                                    PER BOX, STERILE

## (undated) DEVICE — APPLICATOR PREP 26ML 0.7% IOD POVACRYLEX 74% ISO ALC ST

## (undated) DEVICE — GAUZE,SPONGE,4"X4",8PLY,STRL,LF,10/TRAY: Brand: MEDLINE

## (undated) DEVICE — DYONICS 25 PATIENT TUBE SET MUST                                    BE USED WITH 7211007, 12 PER BOX

## (undated) DEVICE — GLOVE SURG SZ 9 THK91MIL LTX FREE SYN POLYISOPRENE ANTI

## (undated) DEVICE — HYPODERMIC SAFETY NEEDLE: Brand: MAGELLAN

## (undated) DEVICE — SUTURE ETHLN SZ 4-0 L18IN NONABSORBABLE BLK L19MM PS-2 3/8 1667H

## (undated) DEVICE — 3M™ STERI-DRAPE™ U-DRAPE 1015: Brand: STERI-DRAPE™

## (undated) DEVICE — Device

## (undated) DEVICE — SYRINGE MED 50ML LUERLOCK TIP

## (undated) DEVICE — GOWN SIRUS NONREIN XL W/TWL: Brand: MEDLINE INDUSTRIES, INC.

## (undated) DEVICE — SHEET,DRAPE,53X77,STERILE: Brand: MEDLINE

## (undated) DEVICE — DYONICS 25 DAY TUBE SET MUST BE                                    USED WITH 7211008, 3 PER BOX

## (undated) DEVICE — BANDAGE ADH W1XL3IN NAT FAB WVN FLX DURABLE N ADH PD SEAL

## (undated) DEVICE — MASC TURNOVER KIT: Brand: MEDLINE INDUSTRIES, INC.

## (undated) DEVICE — GLOVE ORANGE PI 8 1/2   MSG9085

## (undated) DEVICE — GOWN,AURORA,NONREINF,RAGLAN,XXL,STERILE: Brand: MEDLINE

## (undated) DEVICE — WEREWOLF FLOW 50 COBLATION WAND: Brand: COBLATION